# Patient Record
Sex: FEMALE | Race: WHITE | Employment: OTHER | URBAN - METROPOLITAN AREA
[De-identification: names, ages, dates, MRNs, and addresses within clinical notes are randomized per-mention and may not be internally consistent; named-entity substitution may affect disease eponyms.]

---

## 2019-04-23 ENCOUNTER — HOSPITAL ENCOUNTER (OUTPATIENT)
Dept: GENERAL RADIOLOGY | Age: 69
Discharge: HOME | End: 2019-04-25
Payer: MEDICARE

## 2019-04-23 ENCOUNTER — HOSPITAL ENCOUNTER (OUTPATIENT)
Age: 69
Discharge: HOME | End: 2019-04-25
Payer: MEDICARE

## 2019-04-23 DIAGNOSIS — M25.561 RIGHT KNEE PAIN, UNSPECIFIED CHRONICITY: ICD-10-CM

## 2019-04-23 PROCEDURE — 73562 X-RAY EXAM OF KNEE 3: CPT

## 2019-07-15 ENCOUNTER — HOSPITAL ENCOUNTER (OUTPATIENT)
Age: 69
Discharge: HOME | End: 2019-07-17
Payer: MEDICARE

## 2019-07-15 ENCOUNTER — HOSPITAL ENCOUNTER (OUTPATIENT)
Dept: GENERAL RADIOLOGY | Age: 69
Discharge: HOME | End: 2019-07-17
Payer: MEDICARE

## 2019-07-15 DIAGNOSIS — M25.512 LEFT SHOULDER PAIN, UNSPECIFIED CHRONICITY: ICD-10-CM

## 2019-07-15 DIAGNOSIS — M54.2 CERVICALGIA: ICD-10-CM

## 2019-07-15 PROCEDURE — 73030 X-RAY EXAM OF SHOULDER: CPT

## 2019-07-15 PROCEDURE — 72050 X-RAY EXAM NECK SPINE 4/5VWS: CPT

## 2022-12-01 SDOH — ECONOMIC STABILITY: FOOD INSECURITY: ADDITIONAL INFORMATION: NO

## 2022-12-07 ENCOUNTER — HOSPITAL ENCOUNTER (OUTPATIENT)
Dept: DIABETES SERVICES | Age: 72
Setting detail: THERAPIES SERIES
Discharge: HOME | End: 2022-12-07
Payer: MEDICARE

## 2022-12-07 PROCEDURE — G0109 DIAB MANAGE TRN IND/GROUP: HCPCS

## 2022-12-07 ASSESSMENT — PROBLEM AREAS IN DIABETES QUESTIONNAIRE (PAID)
FEELING DEPRESSED WHEN YOU THINK ABOUT LIVING WITH DIABETES: 2
PAID-5 TOTAL SCORE: 9
COPING WITH COMPLICATIONS OF DIABETES: 2
FEELING SCARED WHEN YOU THINK ABOUT LIVING WITH DIABETES: 2
WORRYING ABOUT THE FUTURE AND THE POSSIBILITY OF SERIOUS COMPLICATIONS: 2
FEELING THAT DIABETES IS TAKING UP TOO MUCH OF YOUR MENTAL AND PHYSICAL ENERGY EVERY DAY: 1

## 2022-12-07 NOTE — PROGRESS NOTES
Diabetes Self-Management Education Record    Participant Name: Ruslan Carolina  Referring Provider: Christina Guevara DO  Assessment/Evaluation Ratings:  1=Needs Instruction   4=Demonstrates Understanding/Competency  2=Needs Review   NC=Not Covered    3=Comprehends Key Points  N/A=Not Applicable  Topics/Learning Objectives Pre-session Assess Date:  Instructor initials/date  12/7/22 PC Instr. Date  12/7/22 PC  Instructor initials/date Follow-up Post- session Eval Comments   Diabetes disease process & Treatment process:   -Define type of diabetes in simple terms.  - Describe the ABCs of  diabetes management  -Identify own type of diabetes  -Identify lifestyle changes/treatment options  -other:  1 [x] All     []  []  []  []  []  3 12/7/22 PC  New onset Type 2 DM  Family hx     Developing strategies for Healthy coping/psychosocial issues:    -Describe feelings about living with diabetes  -Identify coping strategies and sources of stress  -Identify support needed & support network available  -Complete PAID-5 Diabetes questionnaire 1  All   [x]  []  []  []    []  3 Date:12/7/22 PC  PAID-5 Score:9  Confidence Score: [address] completed a Diabetes Self- Management Education Assessment on 12/7/22. Part of our assessment is having the patient complete the PAID (Problem Areas in Diabetes Scale)-5 survey. This tool  measures diabetes-related emotional distress a patient may be feeling. Ruslan Carolina scored  9   A total score of >8 indicates possible diabetes related emotional distress, which warrants further assessment and a referral to mental health professional for psychological support and treatment. Behavioral health information given.   PC       Prevention, detection & treatment of Chronic complications:    -Identify the prevention, detection and treatment for complications including immunizations, preventive eye, foot, dental and renal exams as indicated per the participant's duration of diabetes and health status.  -Define the natural course of diabetes and the relationship of blood glucose levels to long term complications of diabetes. 1 [x] All     []            []  3 12/7/22   Hypertension  Cataracts-severe   Prevention, detection & treatment of acute complications:    -State the causes,signs & symptoms of hyper & hypoglycemia, and prevention & treatment strategies.   -Describe sick day guidelines  DKA /indications for ketone testing &  when to call physician  1 [x] All     []      []    3 12/7/22   Hyperglycemia    A1C 12%             -Identify severe weather/situation crisis  & diabetes supplies management 1 []      Using medications safely:   -State effects of diabetes medicines on blood glucose levels;  -List diabetes medication taken, action & side effects 1 [x] All     []  []  3 12/7/22  Metformin at home   Insulin/Injectables/glucagon  -Name appropriate injection sites; proper storage; supplies needed;  NA   []       Demonstrate proper technique  []      Monitoring blood glucose, interpreting and using results:   -Identify the purpose of testing   -Identify recommended & personal blood glucose targets & HgbA1C target levels  -State the Importance of logging blood glucose levels for pattern recognition;   -State benefits of reading/using pt generated health data  -Verbalize safe lancet disposal 1 [x] All     []  []    []  []  []  3  12/7/22   Has meter and testing ac breakfast daily. Asked to log results and take to Dr visits. Ac range 250-350 mg/dl   -Demonstrate proper testing technique  []      Incorporating physical activity into lifestyle:   -State effect of exercise on blood glucose levels;   -State benefits of regular exercise;   -Define safety considerations/food choices if needed.  -Describe contraindications/maintenance of activity.  1 [x] All     []  []    []  []  3 12/7/22   No routine exercise at this time   Incorporating nutritional management into lifestyle:   -Describe effect of type, amount & timing of food on blood glucose  -Describe methods for preparing and planning   healthy meals  -Correctly read food labels for nutritional values  -Name 3 foods high in Carbohydrate  -Plan a sample 4 carbohydrate-controlled meal using Diabetes Plate Method  -Verbalized ability to measure and count carbohydrate gram servings using food labels and carbohydrate food list.    -Plan a carbohydrate-controlled meal based on individual needs/preferences from a Registered Dietitian.   1 [] All       []    []    []  []      []        []                      Developing strategies for problem solving to promote health/change behavior. -Identify 7 self-care behaviors; Personal health risk factors; Benefits, challenges & strategies for behavioral change and set an individualized goal selection. 1       []      []Nutrition  []Monitoring  []Exercise  []Medication  []Other     Identified Barriers to learning/adherence to self management plan:    None  []  other    Instruction Method:  Lecture/Discussion, Power Point Presentation , Handouts, and Return demonstration     Supporting Education Materials/Equipment Provided: Self-management manual and Nutritional Packet   []Maltese materials       [] services     []Other:      Encounter Type Date Attended Start Time End Time Comments No Show Dates   Assessment 12/7/22 PC         Session 1 12/7/22 PC 0900 1130  In person    Session 2        1:1 DSMES          In person Follow-up         Gestational Diabetes         DSMES #3        Meter Instrx        Insulin Instrx           Additional Comments: [] Pt seen individually due to Covid-19 Safety precautions and no group session available.         Date:   Follow-up goal attainment based on patients initial DSMES goal    Dr Notified by [] EMR []Fax        []Post class Hgb A1C  []Medication compliance   []Plate method/meal plan compliance   []Able to state the number of Carbohydrate servings eaten at B,L,D   []Testing blood glucose as prescribed by PCP   []Exercise Routine   []Other:   []Other:     []Patient lost to follow-up  Dr Notified by []EMR []Fax

## 2022-12-08 ENCOUNTER — HOSPITAL ENCOUNTER (OUTPATIENT)
Dept: DIABETES SERVICES | Age: 72
Setting detail: THERAPIES SERIES
Discharge: HOME | End: 2022-12-08
Payer: MEDICARE

## 2022-12-08 PROCEDURE — G0109 DIAB MANAGE TRN IND/GROUP: HCPCS

## 2022-12-08 NOTE — PROGRESS NOTES
Diabetes Self-Management Education Record    Participant Name: Norris Esqueda  Referring Provider: Haley Harley,   Assessment/Evaluation Ratings:  1=Needs Instruction   4=Demonstrates Understanding/Competency  2=Needs Review   NC=Not Covered    3=Comprehends Key Points  N/A=Not Applicable  Topics/Learning Objectives Pre-session Assess Date:  Instructor initials/date  12/7/22 PC Instr. Date  12/7/22 PC  Instructor initials/date Follow-up Post- session Eval Comments   Diabetes disease process & Treatment process:   -Define type of diabetes in simple terms.  - Describe the ABCs of  diabetes management  -Identify own type of diabetes  -Identify lifestyle changes/treatment options  -other:  1 [x] All     []  []  []  []  []  3 12/7/22 PC  New onset Type 2 DM  Family hx     Developing strategies for Healthy coping/psychosocial issues:    -Describe feelings about living with diabetes  -Identify coping strategies and sources of stress  -Identify support needed & support network available  -Complete PAID-5 Diabetes questionnaire 1  All   [x]  []  []  []    []  3 Date:12/7/22   PAID-5 Score:9  Confidence Score: [address] completed a Diabetes Self- Management Education Assessment on 12/7/22. Part of our assessment is having the patient complete the PAID (Problem Areas in Diabetes Scale)-5 survey. This tool  measures diabetes-related emotional distress a patient may be feeling. Norris Esqueda scored  9   A total score of >8 indicates possible diabetes related emotional distress, which warrants further assessment and a referral to mental health professional for psychological support and treatment. Behavioral health information given.   PC       Prevention, detection & treatment of Chronic complications:    -Identify the prevention, detection and treatment for complications including immunizations, preventive eye, foot, dental and renal exams as indicated per the participant's duration of diabetes and health status.  -Define the natural course of diabetes and the relationship of blood glucose levels to long term complications of diabetes. 1 [x] All     []            []  3 12/7/22   Hypertension  Cataracts-severe   Prevention, detection & treatment of acute complications:    -State the causes,signs & symptoms of hyper & hypoglycemia, and prevention & treatment strategies.   -Describe sick day guidelines  DKA /indications for ketone testing &  when to call physician  1 [x] All     []      []    3 12/7/22   Hyperglycemia    A1C 12%             -Identify severe weather/situation crisis  & diabetes supplies management 1 []      Using medications safely:   -State effects of diabetes medicines on blood glucose levels;  -List diabetes medication taken, action & side effects 1 [x] All     []  []  3 12/7/22  Metformin at home   Insulin/Injectables/glucagon  -Name appropriate injection sites; proper storage; supplies needed;  NA   []       Demonstrate proper technique  []      Monitoring blood glucose, interpreting and using results:   -Identify the purpose of testing   -Identify recommended & personal blood glucose targets & HgbA1C target levels  -State the Importance of logging blood glucose levels for pattern recognition;   -State benefits of reading/using pt generated health data  -Verbalize safe lancet disposal 1 [x] All     []  []    []  []  []  3  12/7/22   Has meter and testing ac breakfast daily. Asked to log results and take to Dr visits. Ac range 250-350 mg/dl   -Demonstrate proper testing technique  []      Incorporating physical activity into lifestyle:   -State effect of exercise on blood glucose levels;   -State benefits of regular exercise;   -Define safety considerations/food choices if needed.  -Describe contraindications/maintenance of activity.  1 [x] All     []  []    []  []  3 12/7/22   No routine exercise at this time   Incorporating nutritional management into lifestyle:   -Describe effect of type, amount & timing of food on blood glucose  -Describe methods for preparing and planning   healthy meals  -Correctly read food labels for nutritional values  -Name 3 foods high in Carbohydrate  -Plan a sample 4 carbohydrate-controlled meal using Diabetes Plate Method  -Verbalized ability to measure and count carbohydrate gram servings using food labels and carbohydrate food list.    -Plan a carbohydrate-controlled meal based on individual needs/preferences from a Registered Dietitian.   1 [] All       [x]    [x]    [x]  [x]      [x]        []  3 12/8/22 SE                   Developing strategies for problem solving to promote health/change behavior. -Identify 7 self-care behaviors; Personal health risk factors; Benefits, challenges & strategies for behavioral change and set an individualized goal selection. 1       [x]  3  12/8/22 SE  I will increase my activity at least 2 days a week for 5 min each day  []Nutrition  []Monitoring  [x]Exercise  []Medication  []Other     Identified Barriers to learning/adherence to self management plan:    None  []  other    Instruction Method:  Lecture/Discussion, Power Point Presentation , Handouts, and Return demonstration     Supporting Education Materials/Equipment Provided: Self-management manual and Nutritional Packet   []Croatian materials       [] services     []Other:      Encounter Type Date Attended Start Time End Time Comments No Show Dates   Assessment 12/7/22 PC         Session 1 12/7/22 PC 0900 1130  In person    Session 2 12/8/22 SE 0900 1130 In person    1:1 DSMES          In person Follow-up         Gestational Diabetes         DSMES #3        Meter Instrx        Insulin Instrx           Additional Comments: [] Pt seen individually due to Covid-19 Safety precautions and no group session available. Brought a large apple on Day 1 :-) Having difficulty seeing. Cataract surgery scheduled next month.       Date:   Follow-up goal attainment based on patients initial DSMES goal    Dr Notified by [] EMR []Fax        []Post class Hgb A1C  []Medication compliance   []Plate method/meal plan compliance   []Able to state the number of Carbohydrate servings eaten at B,L,D   []Testing blood glucose as prescribed by PCP   []Exercise Routine   []Other:   []Other:     []Patient lost to follow-up  Dr Notified by []EMR []Fax

## 2022-12-08 NOTE — LETTER
[facility])- Diabetes Education Department Diabetes Education Letter    2022       Re:     Wilfred Christie         :  1950  Dear Dr. Goyo Puri: Thank you for referring your patient, Wilfred Christie, to [facility]) diabetes education. Wilfred Christie has completed their personalized comprehensive education plan. The education plan included the following topics:          Diabetes disease process & treatment   Healthy Eating  Being Active  Taking Medication  Monitoring Glucose  Acute and Chronic Complications  Lifestyle and Healthy coping  Diabetes Distress and Support       In addition, the PAID -5 (Problem Areas in Diabetes) Survey was completed. The results From 2022 were 9  A total score of 8 or greater indicates possible diabetes related emotional distress which warrants further assessment.  [] [address]  and [facility] information provided. Wilfred Christie selected behavioral goal is:    []Healthy Eating  []Monitoring  []Problem Solving  [x]Being Active  []Taking Medication  []Other: We will send a follow-up letter in 3 months to notify you of their progress. Thank you for referring this patient to our program.  Please do not hesitate to call if you have any questions at [phone] [facility] or [facility]) or [phone] ([address] [place] [address]).         Sincerely,    [facility] Diabetes Education Department  American Diabetes Association Recognized DSMES Program rev. 10/19/22

## 2022-12-19 ENCOUNTER — HOSPITAL ENCOUNTER (OUTPATIENT)
Dept: DIABETES SERVICES | Age: 72
Setting detail: THERAPIES SERIES
Discharge: HOME | End: 2022-12-19
Payer: MEDICARE

## 2022-12-19 PROCEDURE — G0108 DIAB MANAGE TRN  PER INDIV: HCPCS

## 2022-12-19 NOTE — PROGRESS NOTES
Diabetes Self-Management Education Record    Participant Name: Jl Linder  Referring Provider: Iman Denson DO  Assessment/Evaluation Ratings:  1=Needs Instruction   4=Demonstrates Understanding/Competency  2=Needs Review   NC=Not Covered    3=Comprehends Key Points  N/A=Not Applicable  Topics/Learning Objectives Pre-session Assess Date:  Instructor initials/date  12/7/22 PC Instr. Date  12/7/22 PC  Instructor initials/date Follow-up Post- session Eval Comments   Diabetes disease process & Treatment process:   -Define type of diabetes in simple terms.  - Describe the ABCs of  diabetes management  -Identify own type of diabetes  -Identify lifestyle changes/treatment options  -other:  1 [x] All     []  []  []  []  []  3 12/7/22 PC  New onset Type 2 DM  Family hx     Developing strategies for Healthy coping/psychosocial issues:    -Describe feelings about living with diabetes  -Identify coping strategies and sources of stress  -Identify support needed & support network available  -Complete PAID-5 Diabetes questionnaire 1  All   [x]  []  []  []    []  3 Date:12/7/22 PC  PAID-5 Score:9  Confidence Score: [address] completed a Diabetes Self- Management Education Assessment on 12/7/22. Part of our assessment is having the patient complete the PAID (Problem Areas in Diabetes Scale)-5 survey. This tool  measures diabetes-related emotional distress a patient may be feeling. Jl Linder scored  9   A total score of >8 indicates possible diabetes related emotional distress, which warrants further assessment and a referral to mental health professional for psychological support and treatment. Behavioral health information given.   PC       Prevention, detection & treatment of Chronic complications:    -Identify the prevention, detection and treatment for complications including immunizations, preventive eye, foot, dental and renal exams as indicated per the participant's duration of diabetes and health status.  -Define the natural course of diabetes and the relationship of blood glucose levels to long term complications of diabetes. 1 [x] All     []            []  3 12/7/22   Hypertension  Cataracts-severe   Prevention, detection & treatment of acute complications:    -State the causes,signs & symptoms of hyper & hypoglycemia, and prevention & treatment strategies.   -Describe sick day guidelines  DKA /indications for ketone testing &  when to call physician  1 [x] All     []      []    3 12/7/22   Hyperglycemia    A1C 12%             -Identify severe weather/situation crisis  & diabetes supplies management 1 []      Using medications safely:   -State effects of diabetes medicines on blood glucose levels;  -List diabetes medication taken, action & side effects 1 [x] All     []  []  3 12/7/22  Metformin at home   Insulin/Injectables/glucagon  -Name appropriate injection sites; proper storage; supplies needed;  NA   []       Demonstrate proper technique  []      Monitoring blood glucose, interpreting and using results:   -Identify the purpose of testing   -Identify recommended & personal blood glucose targets & HgbA1C target levels  -State the Importance of logging blood glucose levels for pattern recognition;   -State benefits of reading/using pt generated health data  -Verbalize safe lancet disposal 1 [x] All     []  []    []  []  []  3  12/7/22   Has meter and testing ac breakfast daily. Asked to log results and take to Dr visits. Ac range 250-350 mg/dl    12/19/22 SE  Recommend checking BS 2-4 times a day. Will talk to dr. Barry Bryantonstrate proper testing technique  []      Incorporating physical activity into lifestyle:   -State effect of exercise on blood glucose levels;   -State benefits of regular exercise;   -Define safety considerations/food choices if needed.  -Describe contraindications/maintenance of activity.  1 [x] All     []  []    []  []  3 12/7/22   No routine exercise at this time  12/19/22 SE  Encouraged activity to ability after each meal.    Incorporating nutritional management into lifestyle:   -Describe effect of type, amount & timing of food on blood glucose  -Describe methods for preparing and planning   healthy meals  -Correctly read food labels for nutritional values  -Name 3 foods high in Carbohydrate  -Plan a sample 4 carbohydrate-controlled meal using Diabetes Plate Method  -Verbalized ability to measure and count carbohydrate gram servings using food labels and carbohydrate food list.    -Plan a carbohydrate-controlled meal based on individual needs/preferences from a Registered Dietitian.   1 [] All       [x]    [x]    [x]  [x]      [x]        [x]  3 12/8/22 SE  Reviewed nutritional recommendations for salt, added sugar, fats, and fiber. Reviewed plate method and portion control. Reviewed which foods contain carbohydrates, how carbs impact blood glucose, how to count carbs, and how to spread carbs evenly throughout the day. Label reading reviewed. Patient very attentive to education and demonstrated understanding. Asked questions and took notes. 12/19/22 SE  Pt instructed on  Calories, 3 meals  3 Snack(4-1-4-1-4-1. Pt was able to plan meal using food models and serving size sheet. Provided patient with meal and snack options that fit her meal plan to choose from for better compliance. Developing strategies for problem solving to promote health/change behavior. -Identify 7 self-care behaviors; Personal health risk factors; Benefits, challenges & strategies for behavioral change and set an individualized goal selection.  1       [x]  3  12/8/22 SE  I will increase my activity at least 2 days a week for 5 min each day  []Nutrition  []Monitoring  [x]Exercise  []Medication  []Other     Identified Barriers to learning/adherence to self management plan:    None  []  other    Instruction Method:  Lecture/Discussion, Power [employer] , Handouts, and Return demonstration     Supporting Education Materials/Equipment Provided: Self-management manual and Nutritional Packet   []Australian materials       [] services     []Other:      Encounter Type Date Attended Start Time End Time Comments No Show Dates   Assessment 12/7/22 PC         Session 1 12/7/22 PC 0900 1130  In person    Session 2  Session 3 12/8/22 SE  12/19/22 SE 0900  [id number]  [id number] In person  In person    1:1 DSMES          In person Follow-up         Gestational Diabetes         DSMES #3        Meter Instrx        Insulin Instrx           Additional Comments: [] Pt seen individually due to Covid-19 Safety precautions and no group session available. Brought a large apple on Day 1 :-) Having difficulty seeing. Cataract surgery scheduled next month.       Date:   Follow-up goal attainment based on patients initial DSMES goal    Dr Notified by [] EMR []Fax        []Post class Hgb A1C  []Medication compliance   []Plate method/meal plan compliance   []Able to state the number of Carbohydrate servings eaten at B,L,D   []Testing blood glucose as prescribed by PCP   []Exercise Routine   []Other:   []Other:     []Patient lost to follow-up  Dr Notified by []EMR []Fax

## 2023-02-21 PROBLEM — M20.5X2 ACQUIRED CLAW TOE OF LEFT FOOT: Status: ACTIVE | Noted: 2023-02-21

## 2023-02-21 PROBLEM — R10.9 ABDOMINAL PAIN: Status: RESOLVED | Noted: 2023-02-21 | Resolved: 2023-02-21

## 2023-02-21 PROBLEM — R10.9 ABDOMINAL PAIN: Status: ACTIVE | Noted: 2023-02-21

## 2023-02-21 PROBLEM — R93.5 ABNORMAL CT OF THE ABDOMEN: Status: ACTIVE | Noted: 2023-02-21

## 2023-02-21 PROBLEM — M20.5X2 ACQUIRED CLAW TOE OF LEFT FOOT: Status: RESOLVED | Noted: 2023-02-21 | Resolved: 2023-02-21

## 2023-02-21 PROBLEM — R93.5 ABNORMAL CT OF THE ABDOMEN: Status: RESOLVED | Noted: 2023-02-21 | Resolved: 2023-02-21

## 2023-02-21 PROBLEM — R93.89 ABNORMAL X-RAY: Status: RESOLVED | Noted: 2023-02-21 | Resolved: 2023-02-21

## 2023-02-21 PROBLEM — R93.89 ABNORMAL X-RAY: Status: ACTIVE | Noted: 2023-02-21

## 2023-02-21 RX ORDER — DESVENLAFAXINE 100 MG/1
100 TABLET, EXTENDED RELEASE ORAL DAILY
COMMUNITY
End: 2023-06-07 | Stop reason: SDUPTHER

## 2023-02-21 RX ORDER — MEMANTINE HYDROCHLORIDE 10 MG/1
10 TABLET ORAL DAILY
COMMUNITY
End: 2023-06-07 | Stop reason: SDUPTHER

## 2023-02-21 RX ORDER — DONEPEZIL HYDROCHLORIDE 10 MG/1
10 TABLET, FILM COATED ORAL DAILY
COMMUNITY
End: 2023-06-07 | Stop reason: SDUPTHER

## 2023-06-07 ENCOUNTER — OFFICE VISIT (OUTPATIENT)
Dept: PRIMARY CARE | Facility: CLINIC | Age: 73
End: 2023-06-07
Payer: MEDICARE

## 2023-06-07 VITALS
OXYGEN SATURATION: 75 % | TEMPERATURE: 98.3 F | SYSTOLIC BLOOD PRESSURE: 148 MMHG | WEIGHT: 201 LBS | BODY MASS INDEX: 37.98 KG/M2 | DIASTOLIC BLOOD PRESSURE: 87 MMHG | HEART RATE: 60 BPM

## 2023-06-07 DIAGNOSIS — G47.33 OBSTRUCTIVE SLEEP APNEA SYNDROME: ICD-10-CM

## 2023-06-07 DIAGNOSIS — E55.9 MILD VITAMIN D DEFICIENCY: ICD-10-CM

## 2023-06-07 DIAGNOSIS — F32.5 MAJOR DEPRESSIVE DISORDER WITH SINGLE EPISODE, IN FULL REMISSION (CMS-HCC): Primary | ICD-10-CM

## 2023-06-07 DIAGNOSIS — F02.80 EARLY ONSET ALZHEIMER'S DEMENTIA WITHOUT BEHAVIORAL DISTURBANCE (MULTI): ICD-10-CM

## 2023-06-07 DIAGNOSIS — F32.1 CURRENT MODERATE EPISODE OF MAJOR DEPRESSIVE DISORDER WITHOUT PRIOR EPISODE (MULTI): ICD-10-CM

## 2023-06-07 DIAGNOSIS — F03.B3 MODERATE DEMENTIA WITH MOOD DISTURBANCE, UNSPECIFIED DEMENTIA TYPE (MULTI): ICD-10-CM

## 2023-06-07 DIAGNOSIS — E66.01 OBESITY, MORBID (MULTI): ICD-10-CM

## 2023-06-07 DIAGNOSIS — G30.0 EARLY ONSET ALZHEIMER'S DEMENTIA WITHOUT BEHAVIORAL DISTURBANCE (MULTI): ICD-10-CM

## 2023-06-07 PROBLEM — N20.0 RENAL CALCULUS, LEFT: Status: ACTIVE | Noted: 2023-06-07

## 2023-06-07 PROBLEM — N20.1 LEFT URETERAL CALCULUS: Status: ACTIVE | Noted: 2023-06-07

## 2023-06-07 PROBLEM — M48.061 LUMBAR CANAL STENOSIS: Status: ACTIVE | Noted: 2023-06-07

## 2023-06-07 PROBLEM — H93.12 TINNITUS OF LEFT EAR: Status: ACTIVE | Noted: 2023-06-07

## 2023-06-07 PROBLEM — I10 HYPERTENSION: Status: ACTIVE | Noted: 2023-06-07

## 2023-06-07 PROBLEM — N20.0 CALCIUM KIDNEY STONE: Status: ACTIVE | Noted: 2023-06-07

## 2023-06-07 PROBLEM — R32 URINARY INCONTINENCE: Status: ACTIVE | Noted: 2023-06-07

## 2023-06-07 PROBLEM — R53.83 FATIGUE: Status: ACTIVE | Noted: 2023-06-07

## 2023-06-07 PROBLEM — M17.10 ARTHRITIS OF KNEE: Status: ACTIVE | Noted: 2023-06-07

## 2023-06-07 PROBLEM — R59.1 LYMPHADENOPATHY: Status: ACTIVE | Noted: 2023-06-07

## 2023-06-07 PROBLEM — R11.2 NAUSEA AND VOMITING: Status: ACTIVE | Noted: 2023-06-07

## 2023-06-07 PROBLEM — G31.84 MILD COGNITIVE IMPAIRMENT WITH MEMORY LOSS: Status: ACTIVE | Noted: 2023-06-07

## 2023-06-07 PROBLEM — F41.9 ANXIETY: Status: ACTIVE | Noted: 2023-06-07

## 2023-06-07 PROBLEM — M25.552 LEFT HIP PAIN: Status: ACTIVE | Noted: 2023-06-07

## 2023-06-07 PROBLEM — R93.89 ABNORMAL X-RAY: Status: ACTIVE | Noted: 2023-06-07

## 2023-06-07 PROBLEM — D72.819 LEUKOPENIA: Status: ACTIVE | Noted: 2023-06-07

## 2023-06-07 PROBLEM — R39.9 UTI SYMPTOMS: Status: ACTIVE | Noted: 2023-06-07

## 2023-06-07 PROBLEM — R40.0 DAYTIME SOMNOLENCE: Status: ACTIVE | Noted: 2023-06-07

## 2023-06-07 PROBLEM — M21.612 HALLUX VALGUS WITH BUNIONS OF LEFT FOOT: Status: ACTIVE | Noted: 2023-06-07

## 2023-06-07 PROBLEM — M20.12 HALLUX VALGUS WITH BUNIONS OF LEFT FOOT: Status: ACTIVE | Noted: 2023-06-07

## 2023-06-07 PROBLEM — J32.9 SINUSITIS: Status: ACTIVE | Noted: 2023-06-07

## 2023-06-07 PROBLEM — M20.5X2 ACQUIRED CLAW TOE OF LEFT FOOT: Status: ACTIVE | Noted: 2023-06-07

## 2023-06-07 PROBLEM — R93.5 ABNORMAL CT OF THE ABDOMEN: Status: ACTIVE | Noted: 2023-06-07

## 2023-06-07 PROBLEM — R10.32 LEFT LOWER QUADRANT ABDOMINAL PAIN OF UNKNOWN ETIOLOGY: Status: ACTIVE | Noted: 2023-06-07

## 2023-06-07 PROBLEM — K52.9 CHRONIC DIARRHEA: Status: ACTIVE | Noted: 2023-06-07

## 2023-06-07 PROBLEM — M21.622 TAILOR'S BUNION OF LEFT FOOT: Status: ACTIVE | Noted: 2023-06-07

## 2023-06-07 PROBLEM — F32.9 DEPRESSION, MAJOR: Status: ACTIVE | Noted: 2023-06-07

## 2023-06-07 PROBLEM — K65.4 SCLEROSING MESENTERITIS (MULTI): Status: ACTIVE | Noted: 2023-06-07

## 2023-06-07 PROBLEM — M54.50 LOW BACK PAIN: Status: ACTIVE | Noted: 2023-06-07

## 2023-06-07 PROBLEM — N63.10 LUMP OF RIGHT BREAST: Status: ACTIVE | Noted: 2023-06-07

## 2023-06-07 PROBLEM — R41.3 MEMORY LOSS OR IMPAIRMENT: Status: ACTIVE | Noted: 2023-06-07

## 2023-06-07 PROBLEM — S20.219A RIB CONTUSION: Status: ACTIVE | Noted: 2023-06-07

## 2023-06-07 PROBLEM — R10.9 ABDOMINAL PAIN: Status: ACTIVE | Noted: 2023-06-07

## 2023-06-07 PROCEDURE — 3077F SYST BP >= 140 MM HG: CPT | Performed by: FAMILY MEDICINE

## 2023-06-07 PROCEDURE — 3079F DIAST BP 80-89 MM HG: CPT | Performed by: FAMILY MEDICINE

## 2023-06-07 PROCEDURE — 1159F MED LIST DOCD IN RCRD: CPT | Performed by: FAMILY MEDICINE

## 2023-06-07 PROCEDURE — 99215 OFFICE O/P EST HI 40 MIN: CPT | Performed by: FAMILY MEDICINE

## 2023-06-07 RX ORDER — OXYCODONE HYDROCHLORIDE 5 MG/1
5 TABLET ORAL EVERY 6 HOURS
COMMUNITY
Start: 2022-10-24 | End: 2023-06-07 | Stop reason: ALTCHOICE

## 2023-06-07 RX ORDER — DESVENLAFAXINE 100 MG/1
100 TABLET, EXTENDED RELEASE ORAL DAILY
Qty: 90 TABLET | Refills: 1 | Status: SHIPPED | OUTPATIENT
Start: 2023-06-07 | End: 2023-08-23 | Stop reason: SDUPTHER

## 2023-06-07 RX ORDER — DESVENLAFAXINE SUCCINATE 50 MG/1
50 TABLET, EXTENDED RELEASE ORAL DAILY
Qty: 7 TABLET | Refills: 0 | Status: SHIPPED | OUTPATIENT
Start: 2023-06-07 | End: 2023-08-23 | Stop reason: WASHOUT

## 2023-06-07 RX ORDER — SODIUM PICOSULFATE, MAGNESIUM OXIDE, AND ANHYDROUS CITRIC ACID 10; 3.5; 12 MG/160ML; G/160ML; G/160ML
LIQUID ORAL
COMMUNITY
Start: 2022-12-30 | End: 2023-06-07 | Stop reason: ALTCHOICE

## 2023-06-07 RX ORDER — VITS A,C,E/LUTEIN/MINERALS 300MCG-200
1 TABLET ORAL DAILY
COMMUNITY

## 2023-06-07 RX ORDER — ACETAMINOPHEN 500 MG
TABLET ORAL
COMMUNITY

## 2023-06-07 RX ORDER — ENOXAPARIN SODIUM 100 MG/ML
INJECTION SUBCUTANEOUS
COMMUNITY
Start: 2022-06-08 | End: 2023-06-07 | Stop reason: ALTCHOICE

## 2023-06-07 RX ORDER — OXYCODONE AND ACETAMINOPHEN 5; 325 MG/1; MG/1
TABLET ORAL
COMMUNITY
Start: 2022-06-22 | End: 2023-06-07 | Stop reason: ALTCHOICE

## 2023-06-07 RX ORDER — RAMIPRIL 10 MG/1
10 CAPSULE ORAL
COMMUNITY
End: 2023-08-23 | Stop reason: ALTCHOICE

## 2023-06-07 RX ORDER — ELECTROLYTES/DEXTROSE
SOLUTION, ORAL ORAL
COMMUNITY
End: 2023-11-17 | Stop reason: WASHOUT

## 2023-06-07 RX ORDER — MEMANTINE HYDROCHLORIDE 10 MG/1
10 TABLET ORAL 2 TIMES DAILY
Qty: 180 TABLET | Refills: 0 | Status: SHIPPED | OUTPATIENT
Start: 2023-06-07 | End: 2023-08-23 | Stop reason: SDUPTHER

## 2023-06-07 RX ORDER — DONEPEZIL HYDROCHLORIDE 10 MG/1
10 TABLET, FILM COATED ORAL NIGHTLY
Qty: 90 TABLET | Refills: 0 | Status: SHIPPED | OUTPATIENT
Start: 2023-06-07 | End: 2023-08-23 | Stop reason: SDUPTHER

## 2023-06-07 RX ORDER — ASCORBIC ACID 500 MG
1 TABLET ORAL DAILY
COMMUNITY
End: 2023-11-17 | Stop reason: WASHOUT

## 2023-06-07 NOTE — PROGRESS NOTES
Subjective   Patient ID: Tabby Morrell is a 72 y.o. female who presents for No chief complaint on file..    HPI pt is here with Мария Henning who is helping her since she is having difficulty, she has finished her pristiq and has been off for about 1 month.  She is tearful off and on and having more difficult time than usual.   She reports memory difficulties. She has been out of medication including desvenlafaxine for at least 1 month. She had many episodes of crying. She feels on edge to any noise. She took this medicaiton for many years and did well on it and will restart    She had an evaluation done for her driving license and it was determined she was unable to drive. She has feelings of loss of independence.     She has an appoinment on 06/07 at the [facility] for evaluation for a new neurologist as hers retired. She will be seen by geriatrics and neuro.  Will refill meds she takes from   Dr Ricks for now and then the new neurologist can change as needed    She recently restarted using her CPAP machine.  She was told this may help her memory problems    She is up to date on vaccinations.     She gets 31286 steps daily and she knows that exercise helps cognitive function. She walks her dog a lot and she has exercise equipment in her house for strength training and aerobics    She complains of red spots under her breast. She has some small hemangiomas    BP elevated some today, she has bp cuff at home she thinks and if not will get one, will monitor and call in her bp's and see if needs meds    Review of Systems    Objective   /87   Pulse 60   Temp 36.8 °C (98.3 °F)   Wt 91.2 kg (201 lb)   SpO2 (!) 75%   BMI 37.98 kg/m²     Physical Exam  Constitutional:       Appearance: Normal appearance.   Cardiovascular:      Rate and Rhythm: Normal rate and regular rhythm.      Pulses: Normal pulses.      Heart sounds: Normal heart sounds.   Pulmonary:      Effort: Pulmonary effort is normal.       Breath sounds: Normal breath sounds.   Musculoskeletal:         General: Normal range of motion.      Cervical back: Normal range of motion and neck supple.   Skin:     General: Skin is warm and dry.   Neurological:      Mental Status: She is alert and oriented to person, place, and time.   Psychiatric:         Thought Content: Thought content normal.         Judgment: Judgment normal.      Comments: Pt often tearful during her appt.         Assessment/Plan     Depression  Start on Desvenlafaxine 50 mg for one week, then increase to 100 mg daily. Prescription sent to pharmacy    Moderate Dementia with Mood Disturbance  Continue on Donepezil 10 mg and memantine 10 mg daily. Prescription sent to pharmacy. No changes made, refilled as she was taking w neurologist prior to correction.  She will get new neurologist and will reevaluate    Hemangiomas under breast.   Educated on hemangiomas.     Hypertension  Advised to get an electronic blood pressure cuff that fits the upper arm.   Advised to monitor pressure regularly, will bring in readings and cuff next visit.  Call if high prior.     Follow up 1 month after evaluation w geriatrics/neuro.      Scribe Attestation  By signing my name below, IMiya Scribe   attest that this documentation has been prepared under the direction and in the presence of Nidhi Severino DO.

## 2023-08-17 NOTE — PROGRESS NOTES
Subjective   Patient ID: Tabby Morrell is a 72 y.o. female who presents for Medicare Annual Wellness Visit Subsequent.    Past Medical, Surgical, and Family History reviewed and updated in chart.     Reviewed all medications by prescribing practitioner or clinical pharmacist (such as prescriptions, OTCs, herbal therapies and supplements) and documented in the medical record.      HPI    Encounter for subsequent AWV: Medicare wellness visit done, patient is in satisfactory living circumstances where the patient's needs are met.  This patient is advised to develop or update their living will and provide us a copy for the chart.        Review of Systems    Patient Care Team:  Nidhi Severino DO as PCP - General  Nidhi Severino DO as PCP - [facility] Attributed Provider     Objective   There were no vitals taken for this visit.    Physical Exam        Assessment/Plan   Problem List Items Addressed This Visit    None

## 2023-08-23 ENCOUNTER — OFFICE VISIT (OUTPATIENT)
Dept: PRIMARY CARE | Facility: CLINIC | Age: 73
End: 2023-08-23
Payer: MEDICARE

## 2023-08-23 VITALS
SYSTOLIC BLOOD PRESSURE: 119 MMHG | WEIGHT: 205 LBS | TEMPERATURE: 98 F | DIASTOLIC BLOOD PRESSURE: 72 MMHG | HEART RATE: 63 BPM | BODY MASS INDEX: 38.73 KG/M2

## 2023-08-23 DIAGNOSIS — M21.962 FOOT DEFORMITY, ACQUIRED, LEFT: ICD-10-CM

## 2023-08-23 DIAGNOSIS — E66.01 OBESITY, MORBID (MULTI): ICD-10-CM

## 2023-08-23 DIAGNOSIS — Z00.00 MEDICARE ANNUAL WELLNESS VISIT, SUBSEQUENT: Primary | ICD-10-CM

## 2023-08-23 DIAGNOSIS — I73.89 OTHER SPECIFIED PERIPHERAL VASCULAR DISEASES (CMS-HCC): ICD-10-CM

## 2023-08-23 DIAGNOSIS — Z78.0 ASYMPTOMATIC MENOPAUSAL STATE: ICD-10-CM

## 2023-08-23 DIAGNOSIS — F03.B3 MODERATE DEMENTIA WITH MOOD DISTURBANCE, UNSPECIFIED DEMENTIA TYPE (MULTI): ICD-10-CM

## 2023-08-23 DIAGNOSIS — F32.5 MAJOR DEPRESSIVE DISORDER WITH SINGLE EPISODE, IN FULL REMISSION (CMS-HCC): ICD-10-CM

## 2023-08-23 DIAGNOSIS — R32 URINARY INCONTINENCE, UNSPECIFIED TYPE: ICD-10-CM

## 2023-08-23 DIAGNOSIS — G30.0 EARLY ONSET ALZHEIMER'S DEMENTIA WITHOUT BEHAVIORAL DISTURBANCE (MULTI): ICD-10-CM

## 2023-08-23 DIAGNOSIS — G47.33 OBSTRUCTIVE SLEEP APNEA SYNDROME: ICD-10-CM

## 2023-08-23 DIAGNOSIS — F02.80 EARLY ONSET ALZHEIMER'S DEMENTIA WITHOUT BEHAVIORAL DISTURBANCE (MULTI): ICD-10-CM

## 2023-08-23 DIAGNOSIS — Z12.31 ENCOUNTER FOR SCREENING MAMMOGRAM FOR BREAST CANCER: ICD-10-CM

## 2023-08-23 PROCEDURE — G0439 PPPS, SUBSEQ VISIT: HCPCS | Performed by: FAMILY MEDICINE

## 2023-08-23 PROCEDURE — 99214 OFFICE O/P EST MOD 30 MIN: CPT | Performed by: FAMILY MEDICINE

## 2023-08-23 PROCEDURE — 1160F RVW MEDS BY RX/DR IN RCRD: CPT | Performed by: FAMILY MEDICINE

## 2023-08-23 PROCEDURE — 3074F SYST BP LT 130 MM HG: CPT | Performed by: FAMILY MEDICINE

## 2023-08-23 PROCEDURE — 1170F FXNL STATUS ASSESSED: CPT | Performed by: FAMILY MEDICINE

## 2023-08-23 PROCEDURE — 1036F TOBACCO NON-USER: CPT | Performed by: FAMILY MEDICINE

## 2023-08-23 PROCEDURE — 1159F MED LIST DOCD IN RCRD: CPT | Performed by: FAMILY MEDICINE

## 2023-08-23 PROCEDURE — 3078F DIAST BP <80 MM HG: CPT | Performed by: FAMILY MEDICINE

## 2023-08-23 RX ORDER — DONEPEZIL HYDROCHLORIDE 10 MG/1
10 TABLET, FILM COATED ORAL NIGHTLY
Qty: 90 TABLET | Refills: 1 | Status: SHIPPED | OUTPATIENT
Start: 2023-08-23 | End: 2024-02-19

## 2023-08-23 RX ORDER — DESVENLAFAXINE 100 MG/1
100 TABLET, EXTENDED RELEASE ORAL DAILY
Qty: 90 TABLET | Refills: 0 | Status: SHIPPED | OUTPATIENT
Start: 2023-08-23 | End: 2024-01-11 | Stop reason: SDUPTHER

## 2023-08-23 RX ORDER — MEMANTINE HYDROCHLORIDE 10 MG/1
10 TABLET ORAL 2 TIMES DAILY
Qty: 180 TABLET | Refills: 1 | Status: SHIPPED | OUTPATIENT
Start: 2023-08-23 | End: 2024-02-19

## 2023-08-23 ASSESSMENT — LIFESTYLE VARIABLES
HOW OFTEN DO YOU HAVE A DRINK CONTAINING ALCOHOL: NEVER
SKIP TO QUESTIONS 9-10: 1
AUDIT-C TOTAL SCORE: 0
HOW OFTEN DO YOU HAVE SIX OR MORE DRINKS ON ONE OCCASION: NEVER
HOW MANY STANDARD DRINKS CONTAINING ALCOHOL DO YOU HAVE ON A TYPICAL DAY: PATIENT DOES NOT DRINK

## 2023-08-23 ASSESSMENT — ENCOUNTER SYMPTOMS
OCCASIONAL FEELINGS OF UNSTEADINESS: 0
LOSS OF SENSATION IN FEET: 0
DEPRESSION: 0

## 2023-08-23 ASSESSMENT — PATIENT HEALTH QUESTIONNAIRE - PHQ9
2. FEELING DOWN, DEPRESSED OR HOPELESS: SEVERAL DAYS
2. FEELING DOWN, DEPRESSED OR HOPELESS: NOT AT ALL
1. LITTLE INTEREST OR PLEASURE IN DOING THINGS: NOT AT ALL
SUM OF ALL RESPONSES TO PHQ9 QUESTIONS 1 AND 2: 0

## 2023-08-23 ASSESSMENT — ACTIVITIES OF DAILY LIVING (ADL)
DRESSING: INDEPENDENT
BATHING: INDEPENDENT
TAKING_MEDICATION: INDEPENDENT
MANAGING_FINANCES: INDEPENDENT
GROCERY_SHOPPING: INDEPENDENT
DOING_HOUSEWORK: INDEPENDENT

## 2023-08-24 NOTE — ASSESSMENT & PLAN NOTE
Pt conintues on medication and seeing neurology regularly, stable at this time and functioning with help of family and friends.  Assessed quarterly

## 2023-10-10 ENCOUNTER — ANCILLARY PROCEDURE (OUTPATIENT)
Dept: RADIOLOGY | Facility: CLINIC | Age: 73
End: 2023-10-10
Payer: MEDICARE

## 2023-10-10 DIAGNOSIS — Z12.31 ENCOUNTER FOR SCREENING MAMMOGRAM FOR MALIGNANT NEOPLASM OF BREAST: ICD-10-CM

## 2023-10-10 DIAGNOSIS — Z78.0 ASYMPTOMATIC MENOPAUSAL STATE: ICD-10-CM

## 2023-10-10 PROCEDURE — 77080 DXA BONE DENSITY AXIAL: CPT

## 2023-10-10 PROCEDURE — 77067 SCR MAMMO BI INCL CAD: CPT | Mod: 50

## 2023-10-10 PROCEDURE — 77063 BREAST TOMOSYNTHESIS BI: CPT | Mod: BILATERAL PROCEDURE

## 2023-10-10 PROCEDURE — 77080 DXA BONE DENSITY AXIAL: CPT | Performed by: RADIOLOGY

## 2023-10-10 PROCEDURE — 77067 SCR MAMMO BI INCL CAD: CPT | Mod: BILATERAL PROCEDURE

## 2023-10-30 ENCOUNTER — TELEPHONE (OUTPATIENT)
Dept: PRIMARY CARE | Facility: CLINIC | Age: 73
End: 2023-10-30
Payer: MEDICARE

## 2023-10-30 NOTE — TELEPHONE ENCOUNTER
Daughter calling , asking for appointment to get clearance to move into senior living, message was given to .

## 2023-11-17 ENCOUNTER — OFFICE VISIT (OUTPATIENT)
Dept: PRIMARY CARE | Facility: CLINIC | Age: 73
End: 2023-11-17
Payer: MEDICARE

## 2023-11-17 VITALS
WEIGHT: 211 LBS | TEMPERATURE: 97.4 F | DIASTOLIC BLOOD PRESSURE: 72 MMHG | SYSTOLIC BLOOD PRESSURE: 134 MMHG | BODY MASS INDEX: 39.84 KG/M2 | HEIGHT: 61 IN | OXYGEN SATURATION: 91 % | HEART RATE: 64 BPM

## 2023-11-17 DIAGNOSIS — M79.671 BILATERAL FOOT PAIN: ICD-10-CM

## 2023-11-17 DIAGNOSIS — M20.12 HALLUX VALGUS WITH BUNIONS OF LEFT FOOT: ICD-10-CM

## 2023-11-17 DIAGNOSIS — R26.89 BALANCE DISORDER: ICD-10-CM

## 2023-11-17 DIAGNOSIS — F33.42 RECURRENT MAJOR DEPRESSIVE DISORDER, IN FULL REMISSION (CMS-HCC): ICD-10-CM

## 2023-11-17 DIAGNOSIS — M17.10 ARTHRITIS OF KNEE: ICD-10-CM

## 2023-11-17 DIAGNOSIS — G62.9 NEUROPATHY: ICD-10-CM

## 2023-11-17 DIAGNOSIS — Z00.00 HEALTHCARE MAINTENANCE: ICD-10-CM

## 2023-11-17 DIAGNOSIS — F02.80 EARLY ONSET ALZHEIMER'S DEMENTIA WITHOUT BEHAVIORAL DISTURBANCE (MULTI): Primary | ICD-10-CM

## 2023-11-17 DIAGNOSIS — M79.672 BILATERAL FOOT PAIN: ICD-10-CM

## 2023-11-17 DIAGNOSIS — M21.612 HALLUX VALGUS WITH BUNIONS OF LEFT FOOT: ICD-10-CM

## 2023-11-17 DIAGNOSIS — G30.0 EARLY ONSET ALZHEIMER'S DEMENTIA WITHOUT BEHAVIORAL DISTURBANCE (MULTI): Primary | ICD-10-CM

## 2023-11-17 DIAGNOSIS — H04.123 DRY EYES: ICD-10-CM

## 2023-11-17 DIAGNOSIS — Z86.19 H/O COLD SORES: ICD-10-CM

## 2023-11-17 DIAGNOSIS — G31.84 MILD COGNITIVE IMPAIRMENT WITH MEMORY LOSS: ICD-10-CM

## 2023-11-17 DIAGNOSIS — F41.9 ANXIETY: ICD-10-CM

## 2023-11-17 DIAGNOSIS — E55.9 MILD VITAMIN D DEFICIENCY: ICD-10-CM

## 2023-11-17 PROBLEM — G30.1 LATE ONSET ALZHEIMER'S DISEASE WITHOUT BEHAVIORAL DISTURBANCE (MULTI): Status: ACTIVE | Noted: 2023-11-17

## 2023-11-17 PROCEDURE — 1036F TOBACCO NON-USER: CPT | Performed by: FAMILY MEDICINE

## 2023-11-17 PROCEDURE — 3075F SYST BP GE 130 - 139MM HG: CPT | Performed by: FAMILY MEDICINE

## 2023-11-17 PROCEDURE — 1126F AMNT PAIN NOTED NONE PRSNT: CPT | Performed by: FAMILY MEDICINE

## 2023-11-17 PROCEDURE — 1160F RVW MEDS BY RX/DR IN RCRD: CPT | Performed by: FAMILY MEDICINE

## 2023-11-17 PROCEDURE — 3078F DIAST BP <80 MM HG: CPT | Performed by: FAMILY MEDICINE

## 2023-11-17 PROCEDURE — 1159F MED LIST DOCD IN RCRD: CPT | Performed by: FAMILY MEDICINE

## 2023-11-17 PROCEDURE — 99215 OFFICE O/P EST HI 40 MIN: CPT | Performed by: FAMILY MEDICINE

## 2023-11-17 RX ORDER — ZINC GLUCONATE 50 MG
500 TABLET ORAL
Qty: 30 CAPSULE | Refills: 0
Start: 2023-11-17

## 2023-11-17 RX ORDER — DICLOFENAC SODIUM 10 MG/G
4 GEL TOPICAL 3 TIMES DAILY PRN
Qty: 100 G | Refills: 0
Start: 2023-11-17

## 2023-11-17 RX ORDER — DIAPER,BRIEF,ADULT, DISPOSABLE
500 EACH MISCELLANEOUS
Qty: 30 TABLET
Start: 2023-11-17

## 2023-11-17 RX ORDER — GUAIFENESIN AND PHENYLEPHRINE HCL 400; 10 MG/1; MG/1
1000 TABLET ORAL
Qty: 30 CAPSULE | Refills: 0
Start: 2023-11-17

## 2023-11-17 NOTE — PROGRESS NOTES
"Subjective   Patient ID: Tabby Morrell is a 73 y.o. female who presents for PAPER WORK.    HPI   The patient presents to the clinic to complete paperwork for senior living. She has past medical history of major depressive disorder, anxiety, and early-onset Alzheimer's disease.    The patient is moving to [facility]. She reports to the clinic as she requires paperwork for her to become resident.  Needs summary of OV stating PCP knows she is changing living situation, med list and supplements and orders for PT/OT and speech therapy for cognition.   The patient requests PT sessions as she has foot pain, mild right knee pain, and balance issues.    The patient also reports that her eye specialist prescribed Oasis Plus eye drops for the patient to use daily, 1 drop each eye bid.    She also reports that she has been using diclofenac gel on her feet and the medication has been working well. She denies any side-effects to the medication.  Needs added to med list so can use in new living situation    The patient continues to take vitamin D3, magnesium, Ocuvite with Luterin, vitamin B complex, and turmeric supplements.    The patient reports that she received the flu vaccine and COVID-19 booster vaccine on 09/17/2023. She also had mammogram and DEXA bone density screenings on 10/10/2023.    She continues with neurology follow up with Dr Guan    Review of Systems    Objective   /72   Pulse 64   Temp 36.3 °C (97.4 °F)   Ht 1.549 m (5' 1\")   Wt 95.7 kg (211 lb)   SpO2 91%   BMI 39.87 kg/m²     Physical Exam    Assessment/Plan   Problem List Items Addressed This Visit             ICD-10-CM    Anxiety F41.9    Arthritis of knee M17.10    Relevant Medications    turmeric root extract 500 mg capsule    diclofenac sodium (Voltaren) 1 % gel gel    Other Relevant Orders    Referral to Physical Therapy    Early onset Alzheimer's dementia without behavioral disturbance (CMS/HCC) - Primary G30.0, F02.80 "    Relevant Orders    Referral to Speech Therapy    Hallux valgus with bunions of left foot M20.12, M21.612    Relevant Orders    Referral to Physical Therapy    Major depression in complete remission (CMS/[facility]) F32.5    Mild cognitive impairment with memory loss G31.84    Relevant Orders    Referral to Speech Therapy    Mild vitamin D deficiency E55.9    Neuropathy G62.9    Relevant Orders    Referral to Occupational Therapy     Other Visit Diagnoses         Codes    Balance disorder     R26.89    Relevant Orders    Referral to Physical Therapy    Referral to Occupational Therapy    Dry eyes     H04.123    Relevant Medications    lubricating eye drops ophthalmic solution    H/O cold sores     Z86.19    Relevant Medications    lysine 500 mg tablet    Bilateral foot pain     M79.671, M79.672    Relevant Medications    diclofenac sodium (Voltaren) 1 % gel gel    Healthcare maintenance     Z00.00    Relevant Medications    magnesium oxide 500 mg capsule               The patient's DEXA bone density scan was reviewed (10/10/2023). She was diagnosed with osteopenia in the left femural neck, but her spine was normal.      The patient was advised to continue taking her vitamin supplements and med list updated w these    In accordance with her request, the patient received a referral to physical therapy. She also received referrals to occupational therapy (to adjust to new home) and speech therapy (for cognition).    Will  copy of summary and OV and med list and orders    Will let me know if needs cxr or lab for TB since pt converted in med school and was treated    The patient will follow-up in January 2024, unless otherwise needed.    Scribe Attestation  By signing my name below, I, James Medina   attest that this documentation has been prepared under the direction and in the presence of Nidhi Severino DO.

## 2023-11-30 ENCOUNTER — TELEPHONE (OUTPATIENT)
Dept: PRIMARY CARE | Facility: CLINIC | Age: 73
End: 2023-11-30
Payer: MEDICARE

## 2023-11-30 DIAGNOSIS — Z11.1 ENCOUNTER FOR SCREENING FOR RESPIRATORY TUBERCULOSIS: Primary | ICD-10-CM

## 2023-11-30 NOTE — TELEPHONE ENCOUNTER
Pts nursing home states she needs a chest x ray order put in pls. Thank you. Will update you on the DNR and med list when they call back tomorrow

## 2023-12-01 ENCOUNTER — TELEPHONE (OUTPATIENT)
Dept: PRIMARY CARE | Facility: CLINIC | Age: 73
End: 2023-12-01
Payer: MEDICARE

## 2024-01-10 ENCOUNTER — OFFICE VISIT (OUTPATIENT)
Dept: PRIMARY CARE | Facility: CLINIC | Age: 74
End: 2024-01-10
Payer: MEDICARE

## 2024-01-10 VITALS
BODY MASS INDEX: 39.27 KG/M2 | HEART RATE: 70 BPM | TEMPERATURE: 97.8 F | RESPIRATION RATE: 18 BRPM | HEIGHT: 60 IN | SYSTOLIC BLOOD PRESSURE: 137 MMHG | OXYGEN SATURATION: 93 % | WEIGHT: 200 LBS | DIASTOLIC BLOOD PRESSURE: 76 MMHG

## 2024-01-10 DIAGNOSIS — E55.9 MILD VITAMIN D DEFICIENCY: ICD-10-CM

## 2024-01-10 DIAGNOSIS — I10 PRIMARY HYPERTENSION: ICD-10-CM

## 2024-01-10 DIAGNOSIS — G47.33 OBSTRUCTIVE SLEEP APNEA SYNDROME: ICD-10-CM

## 2024-01-10 DIAGNOSIS — F03.B3 MODERATE DEMENTIA WITH MOOD DISTURBANCE, UNSPECIFIED DEMENTIA TYPE (MULTI): ICD-10-CM

## 2024-01-10 DIAGNOSIS — F32.5 MAJOR DEPRESSIVE DISORDER WITH SINGLE EPISODE, IN FULL REMISSION (CMS-HCC): ICD-10-CM

## 2024-01-10 DIAGNOSIS — F41.9 ANXIETY: ICD-10-CM

## 2024-01-10 DIAGNOSIS — I73.89 OTHER SPECIFIED PERIPHERAL VASCULAR DISEASES (CMS-HCC): ICD-10-CM

## 2024-01-10 DIAGNOSIS — M17.10 ARTHRITIS OF KNEE: ICD-10-CM

## 2024-01-10 DIAGNOSIS — E66.01 OBESITY, MORBID (MULTI): ICD-10-CM

## 2024-01-10 DIAGNOSIS — E78.5 HYPERLIPIDEMIA, UNSPECIFIED HYPERLIPIDEMIA TYPE: ICD-10-CM

## 2024-01-10 DIAGNOSIS — D72.819 LEUKOPENIA, UNSPECIFIED TYPE: Primary | ICD-10-CM

## 2024-01-10 PROCEDURE — 99215 OFFICE O/P EST HI 40 MIN: CPT | Performed by: FAMILY MEDICINE

## 2024-01-10 PROCEDURE — 3075F SYST BP GE 130 - 139MM HG: CPT | Performed by: FAMILY MEDICINE

## 2024-01-10 PROCEDURE — 1159F MED LIST DOCD IN RCRD: CPT | Performed by: FAMILY MEDICINE

## 2024-01-10 PROCEDURE — 3078F DIAST BP <80 MM HG: CPT | Performed by: FAMILY MEDICINE

## 2024-01-10 PROCEDURE — 1126F AMNT PAIN NOTED NONE PRSNT: CPT | Performed by: FAMILY MEDICINE

## 2024-01-10 PROCEDURE — 1036F TOBACCO NON-USER: CPT | Performed by: FAMILY MEDICINE

## 2024-01-10 ASSESSMENT — PATIENT HEALTH QUESTIONNAIRE - PHQ9
2. FEELING DOWN, DEPRESSED OR HOPELESS: NOT AT ALL
SUM OF ALL RESPONSES TO PHQ9 QUESTIONS 1 AND 2: 0
1. LITTLE INTEREST OR PLEASURE IN DOING THINGS: NOT AT ALL

## 2024-01-10 ASSESSMENT — PAIN SCALES - GENERAL: PAINLEVEL: 0-NO PAIN

## 2024-01-10 NOTE — ASSESSMENT & PLAN NOTE
Pt compliant with medication, has been in remission, questioning if medication helping as much as used to per pt although denies depression s/s.  Reevaluate 6 months

## 2024-01-10 NOTE — PROGRESS NOTES
Subjective   Patient ID: Tabby Morrell is a 73 y.o. female who presents for 4-6 wk f/up.    HPI   The patient presents to the clinic for a 4-6 week follow-up. She has past medical history of chronic diarrhea, anxiety, depression, arthritis of knee, Alzheimer's disease, lumbar canal stenosis, and SHANTEL.    The patient states that she primarily visited the clinic today to fill/sign DNR (Do not resuscitate) paperwork. She is accompanied today by a family member, her daughter Rosanna, who is 's health care power of atty.    The patient moved into assisted living recently. She has healthcare providers at the assisted living facility that are responsible for giving medication to the patient. She states that she needs to confirm that she is receiving all of her required medication. She reports that she is enjoying life at her new facility. She states that she sleeps well and denies any nightmares. Uses her cpap nightly and wakes up refreshed.  The patient states that she enjoys socializing with the other residents at the living facility. She also participates in physical therapy sessions (2x per week), occupational therapy sessions (2x per week), and speech therapy sessions (1x per week) at her facility.  She has her dog with her and walks regularly daily    The patient continues to take desvenlafaxine medication. She states that the medication is not working as well as before. She denies any side-effects to desvenlafaxine medication. She continues to take donepezil medication. She also states that this medication is not working as  well before. She denies any side-effects to donepezil medication. The patient believes that her medication was more potent when she would pick it up from [facility] (assisted living facility is now responsible for obtaining and giving medication to the patient).  Will look into if having different results from different generics    The patient reports symptoms of a nervous tic of  touching L side of head to where she has had bald spot.  Much better now since is moved and settled.  Had a lot of anxiety prior nonths to her move due to unable to take care of things as well as leaving her home. She states that she had these symptoms before she moved into her new assisted living facility. The patient's family member also reports that the patient experiences sudden mood changes.    Review of Systems    Objective   /76   Pulse 70   Temp 36.6 °C (97.8 °F)   Resp 18   Ht 1.524 m (5')   Wt 90.7 kg (200 lb)   SpO2 93%   BMI 39.06 kg/m²     Physical Exam  Constitutional:       Appearance: Normal appearance.   Neck:      Vascular: No carotid bruit.   Cardiovascular:      Rate and Rhythm: Normal rate and regular rhythm.      Pulses: Normal pulses.      Heart sounds: Normal heart sounds.   Pulmonary:      Effort: Pulmonary effort is normal.      Breath sounds: Normal breath sounds.   Musculoskeletal:         General: No tenderness. Normal range of motion.      Cervical back: Normal range of motion.      Right lower leg: No edema.      Left lower leg: No edema.   Skin:     General: Skin is warm and dry.   Neurological:      Mental Status: She is alert and oriented to person, place, and time.   Psychiatric:         Mood and Affect: Mood normal.         Thought Content: Thought content normal.         Assessment/Plan   Problem List Items Addressed This Visit             ICD-10-CM    Anxiety F41.9    Relevant Orders    TSH with reflex to Free T4 if abnormal    Arthritis of knee M17.10    Moderate dementia with mood disturbance, unspecified dementia type (CMS/HCC) F03.B3     Stable, compliant with medication, seeing neuro this week also         Hypertension I10    Relevant Orders    Comprehensive Metabolic Panel    Leukopenia - Primary D72.819    Relevant Orders    CBC and Auto Differential    Major depression in complete remission (CMS/HCC) F32.5     Pt compliant with medication, has been in  remission, questioning if medication helping as much as used to per pt although denies depression s/s.  Reevaluate 6 months         Mild vitamin D deficiency E55.9    Relevant Orders    Vitamin D 25-Hydroxy,Total (for eval of Vitamin D levels)    Obstructive sleep apnea syndrome G47.33    Obesity, morbid (CMS/[facility]) E66.01     [facility], changing diet and greatly increased exercise, reassess 6 month         Other specified peripheral vascular diseases (CMS/[facility]) I73.89     Asymptomatic, pt is doing PT and walking her dog a lot and is doing well          Other Visit Diagnoses         Codes    Hyperlipidemia, unspecified hyperlipidemia type     E78.5    Relevant Orders    Lipid Panel               In regards to the patient's concerns with the effectiveness of her desvenlafaxine and donepezil medications, she was informed that there are multiple different generic versions of these medications. She was advised to consult with healthcare providers at her facility to determine if she is receiving different generic versions of her medication (in comparison to when she picked up her medication from [facility]). Can order for her her old generic and see if feels better.  Daughter will speak to nursing supervisor to see how to do this    In regards to the patient's concerns of a nervous tic and sudden mood changes, she was advised that her symptoms are likely secondary to anxiety. The patient went through a major lifestyle change (new home) which likely contributed to her anxiety symptoms. Continue monitoring symptoms for exacerbation.  She is doing much better, she feels like the [facility] living place is her home and is happy there    The patient's DNR form was filled/signed as directed. A copy of the paperwork was taken for the records of the clinic.  Discussed thoroughly with her pt and her daughter today    Labs (CBC, CMP, lipid panel, TSH, vitamin D) were ordered for the patient. She intends to complete  her labs soon. The clinic will call the patient upon receiving her lab results.    Prospective immunizations were discussed with the patient. She is up-to-date on most of her immunizations (Flu, COVID-19 booster, Shingrix).    She will follow-up in August 2024 for medicare wellness unless needs to be seen sooner, unless otherwise needed.    Scribe Attestation  By signing my name below, IJonelle Scribe   attest that this documentation has been prepared under the direction and in the presence of Nidhi Severino DO.

## 2024-01-11 ENCOUNTER — OFFICE VISIT (OUTPATIENT)
Dept: NEUROLOGY | Facility: CLINIC | Age: 74
End: 2024-01-11
Payer: MEDICARE

## 2024-01-11 VITALS
HEART RATE: 70 BPM | DIASTOLIC BLOOD PRESSURE: 80 MMHG | SYSTOLIC BLOOD PRESSURE: 159 MMHG | RESPIRATION RATE: 18 BRPM | OXYGEN SATURATION: 97 %

## 2024-01-11 DIAGNOSIS — F32.5 MAJOR DEPRESSIVE DISORDER WITH SINGLE EPISODE, IN FULL REMISSION (CMS-HCC): ICD-10-CM

## 2024-01-11 DIAGNOSIS — G30.1 LATE ONSET ALZHEIMER'S DISEASE WITHOUT BEHAVIORAL DISTURBANCE (MULTI): Primary | ICD-10-CM

## 2024-01-11 DIAGNOSIS — F02.80 LATE ONSET ALZHEIMER'S DISEASE WITHOUT BEHAVIORAL DISTURBANCE (MULTI): Primary | ICD-10-CM

## 2024-01-11 PROCEDURE — 99215 OFFICE O/P EST HI 40 MIN: CPT | Performed by: PSYCHIATRY & NEUROLOGY

## 2024-01-11 PROCEDURE — 1036F TOBACCO NON-USER: CPT | Performed by: PSYCHIATRY & NEUROLOGY

## 2024-01-11 PROCEDURE — 1159F MED LIST DOCD IN RCRD: CPT | Performed by: PSYCHIATRY & NEUROLOGY

## 2024-01-11 PROCEDURE — 3077F SYST BP >= 140 MM HG: CPT | Performed by: PSYCHIATRY & NEUROLOGY

## 2024-01-11 PROCEDURE — 1160F RVW MEDS BY RX/DR IN RCRD: CPT | Performed by: PSYCHIATRY & NEUROLOGY

## 2024-01-11 PROCEDURE — 3079F DIAST BP 80-89 MM HG: CPT | Performed by: PSYCHIATRY & NEUROLOGY

## 2024-01-11 PROCEDURE — 1126F AMNT PAIN NOTED NONE PRSNT: CPT | Performed by: PSYCHIATRY & NEUROLOGY

## 2024-01-11 RX ORDER — DESVENLAFAXINE 100 MG/1
100 TABLET, EXTENDED RELEASE ORAL DAILY
Qty: 30 TABLET | Refills: 3 | Status: SHIPPED | OUTPATIENT
Start: 2024-01-11

## 2024-01-11 NOTE — PROGRESS NOTES
[place], [place]      Department of Neurology  [facility]     FU1 Consultation    Dr. 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  Information is from the patient, anju (Alejandrina), and the EMR.  HPI: : Pt was referred for Neuropsych eval due to memory issues and her Dad's having had AD.  : Pt struggles to recall first symptoms, now struggles to recall first symptoms but it seems word-finding difficulty predominated.  :  Pt and anju report her spirits have improved but still with word-finding struggles and misplacing or finding things she planned to do.  : Recent driving eval led to being told to stop driving, but she persists. : She has PT at her [facility] w/ ST/PT/OT; her dog Omar.   Trouble with all of their names, .    Med Hx   Allergies: ecASAàanaphylaxis, NSAIDs, shellfish  Rx:  [id number], [id number], [id number], CPAP  H/O: HTN, depression, anxiety, SHANTEL, urinary urgency   arthritis, LBP/lumbar stenosis, osteoporosis, +PPD  S/P: cholecystx, hysterx, rib fxs from trauma, Lt knee replacement (Rt scheduled)  FHx: Dad AD, HTN;   Mom CLL, HTN  PSHx: mendez Velez, graduate [employer]; single in [facility]; uses CPAP, exercises, stopped driving   ROS: Genl: Skin-Skel Lt foot pain Cardio-pulm nl      U/L-GI nl     Neuro:  Chronic LBP, neck pain, HAs;  w/o CVA, TIA, TMB, VBI   Exam: DF=561/80, HR=70, RR=18, va=162  V4oaw=81%  General:   lungs w/ clear w/ good air mvmt,   RRR w/o MRG,  full carotids w/o T/B      Abdo soft +BS no bruit;   w/ mild dependent lymphedema   Neuro   MS:  calm w/ wnl affect range;  no hallucins, delus/agitation   CN:  FEOM w/o nystagmus or ptosis Face symm S&E    Motor: strong & steady w/ wnl tone symm  w/o invol mvmts   MSRs: +2 but for +1 AJs   w/o clonus   w/o spread   Sensation:  symm  LT, cold, and vib on face, > hands, >> legs   -Romberg, symm sway ~1cm     Coordination: FT fast w/ reg pace bilat     FNF accurate & symm to fixed target     Gait: slow & unsteady, short stride,  little armswing  3 step turn, unsteady tandem   Cognitive:  RH;  Language  recept: answers & follows;  express:  full sents w/o paraphasias  Some misnaming but no overt word finding diffic: no pauses   Naming:   names:  body parts=3/3   objects=3/3      Praxis:  pen twirling fast & symm (lt took practice)  w/o synkinesia   Mem:  USpresident correcxt, my name correct   Executive:  Stroop names=9/9 correct    colors=2/9 errors, 1 self-corrected  Prev MoCA HS +1=15/30  c/w mod impair   GDS: 1/15  not suggest mood disorder  Labs (to 19-May-2022):  wbc=4.4,  hct=46.6,  ojx=808   glu=79, bun/crt=13/69, GFR>90,  AST=31  ALT=29, AlkP=87  TSH=1.82     cwvs=353 , hdl=87 , clm=442, chol/hdl=2.4  N49=5256, fol>24  D3=28  Neuropsych ([place], 29-Sep-2022):  Refd Jean-Paul 2019 as 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 A blind MoCA=17/22.  c/w frontal-subcort dysfunc w/ deficits in exec func, speed, AD less likely.  Spine MRI (16-Dec-2019): lumbar spondylosis, severe canal stenosis, bilat forami stenois L4/5  Brain MRI (3-Apr-2019): chronic sml vsl ischemic changes, cerebral vol approp to age  PET (7-Oct-2022): Prominent asym hypometabolism in Lt parietal and temporal lobe, a pattern suggestive of Alzheimer's disease in the appropriate clinical setting.  A&P:  Summary 4y memory decline: word-finding diffic then losing things.  Genl notable for obesity & mild distal lymphedema.  Neuro exam w/ LE areflexia but less and gait instability then before.  Cognitive exam w/o overt word-finding diffic, slow reading, mild difficulty w/ color naming in Stroop.  Prev MoCA=15/30, GDS=1/15.  Labs unremarkable.  Neuropsych. Eval c/w frt subcort dysfunc; AD vs subcort demenita.  Brain MRI (2019) showed sml vsl dis and loss of cerebral volume.  PET (2022)  showed Lt parietotemporal hypometabolism suggestive of AD. Interpret:  Course c/w mild AD.  Again recc cane or stick, to little avail.  Plan:  Meds appro & well-tolerated.   F/U:  I discussed these matters with the pt and anju w/ appro questions and good understanding.  I will arrange RTC in 6 months.  I encouraged interim contact if issues arise.   Thank you for allowing me to participate in the care of your patient.   Sincerely yours, Parvez Guan M.D., Ph.D.

## 2024-02-07 DIAGNOSIS — M79.672 BILATERAL FOOT PAIN: ICD-10-CM

## 2024-02-07 DIAGNOSIS — R26.89 BALANCE DISORDER: Primary | ICD-10-CM

## 2024-02-07 DIAGNOSIS — M21.962 FOOT DEFORMITY, ACQUIRED, LEFT: ICD-10-CM

## 2024-02-07 DIAGNOSIS — M79.671 BILATERAL FOOT PAIN: ICD-10-CM

## 2024-02-12 ENCOUNTER — TELEPHONE (OUTPATIENT)
Dept: PRIMARY CARE | Facility: CLINIC | Age: 74
End: 2024-02-12
Payer: MEDICARE

## 2024-02-12 NOTE — TELEPHONE ENCOUNTER
Sherri called asking for something for patient with sinus congestion it started on Saturday she was tested for covid today negative result please advise.

## 2024-02-22 ENCOUNTER — TELEPHONE (OUTPATIENT)
Dept: PRIMARY CARE | Facility: CLINIC | Age: 74
End: 2024-02-22
Payer: MEDICARE

## 2024-02-22 DIAGNOSIS — R05.3 PERSISTENT COUGH: Primary | ICD-10-CM

## 2024-02-22 NOTE — TELEPHONE ENCOUNTER
PATIENT HAS A NON PRODUCTIVE COUGH IT'S VERY DEEP THEY WOULD LIKE AN ORDER FOR CHEST XRAY PLEASE ADVISE.

## 2024-02-23 ENCOUNTER — TELEPHONE (OUTPATIENT)
Dept: PRIMARY CARE | Facility: CLINIC | Age: 74
End: 2024-02-23
Payer: MEDICARE

## 2024-02-23 NOTE — TELEPHONE ENCOUNTER
They sent us her xray from yesterday patient has no sob her vitals are good no recent infection just a deep cough that is why the xray was ordered patient tested negative for covid murray grand wants to know if you want to do anything about the rest of the chest xray. Please advise.

## 2024-05-16 ENCOUNTER — OFFICE VISIT (OUTPATIENT)
Dept: PRIMARY CARE | Facility: CLINIC | Age: 74
End: 2024-05-16
Payer: MEDICARE

## 2024-05-16 VITALS
SYSTOLIC BLOOD PRESSURE: 133 MMHG | TEMPERATURE: 97.6 F | WEIGHT: 221 LBS | HEART RATE: 62 BPM | DIASTOLIC BLOOD PRESSURE: 81 MMHG | BODY MASS INDEX: 43.39 KG/M2 | HEIGHT: 60 IN | RESPIRATION RATE: 18 BRPM | OXYGEN SATURATION: 93 %

## 2024-05-16 DIAGNOSIS — G47.33 OBSTRUCTIVE SLEEP APNEA SYNDROME: ICD-10-CM

## 2024-05-16 DIAGNOSIS — E55.9 MILD VITAMIN D DEFICIENCY: ICD-10-CM

## 2024-05-16 DIAGNOSIS — F03.B3 MODERATE DEMENTIA WITH MOOD DISTURBANCE, UNSPECIFIED DEMENTIA TYPE (MULTI): ICD-10-CM

## 2024-05-16 DIAGNOSIS — F41.9 ANXIETY: ICD-10-CM

## 2024-05-16 DIAGNOSIS — I10 PRIMARY HYPERTENSION: Primary | ICD-10-CM

## 2024-05-16 DIAGNOSIS — F32.5 MAJOR DEPRESSIVE DISORDER WITH SINGLE EPISODE, IN FULL REMISSION (CMS-HCC): ICD-10-CM

## 2024-05-16 PROBLEM — F32.9 DEPRESSION, MAJOR: Status: RESOLVED | Noted: 2023-06-07 | Resolved: 2024-05-16

## 2024-05-16 PROCEDURE — 1123F ACP DISCUSS/DSCN MKR DOCD: CPT | Performed by: FAMILY MEDICINE

## 2024-05-16 PROCEDURE — 1159F MED LIST DOCD IN RCRD: CPT | Performed by: FAMILY MEDICINE

## 2024-05-16 PROCEDURE — 1157F ADVNC CARE PLAN IN RCRD: CPT | Performed by: FAMILY MEDICINE

## 2024-05-16 PROCEDURE — 1126F AMNT PAIN NOTED NONE PRSNT: CPT | Performed by: FAMILY MEDICINE

## 2024-05-16 PROCEDURE — 99214 OFFICE O/P EST MOD 30 MIN: CPT | Performed by: FAMILY MEDICINE

## 2024-05-16 PROCEDURE — 3079F DIAST BP 80-89 MM HG: CPT | Performed by: FAMILY MEDICINE

## 2024-05-16 PROCEDURE — 3075F SYST BP GE 130 - 139MM HG: CPT | Performed by: FAMILY MEDICINE

## 2024-05-16 ASSESSMENT — PAIN SCALES - GENERAL: PAINLEVEL: 0-NO PAIN

## 2024-05-16 ASSESSMENT — PATIENT HEALTH QUESTIONNAIRE - PHQ9
SUM OF ALL RESPONSES TO PHQ9 QUESTIONS 1 AND 2: 0
2. FEELING DOWN, DEPRESSED OR HOPELESS: NOT AT ALL
1. LITTLE INTEREST OR PLEASURE IN DOING THINGS: NOT AT ALL

## 2024-05-16 NOTE — PROGRESS NOTES
Subjective   Patient ID: Tabby Morrell is a 73 y.o. female who presents for f/up.    HPI   The patient presents to the clinic for a follow-up. She has past medical history of HTN, peripheral vascular diseases, mild vitamin D deficiency, chronic diarrhea, calcium kidney stones, left renal calculus, urinary incontinence, anxiety, depression, arthritis of knee, low back pain, daytime somnolence, moderate dementia, lumbar canal stenosis, mild cognitive impairment, memory loss, neuropathy, and SHANTEL.    She is accompanied by a family member in the clinic today.  Her daughter    She continues on desvenlafaxine 100 mg daily for treatment of depression/anxiety symptoms. She states that desvenlafaxine medication is working well. Her mood has been good. She denies any side-effects to her desvenlafaxine medication.  Anxiety is controlled.  She is enjoying her activities at senior living    She also takes Namenda 10 mg (2 times daily) for treatment of memory loss. She states that Namenda medication is working well for treatment of this condition. She denies any side-effects to her Namenda medication.  Neither feel her dementia has progressed recently.  She follows w neuro    Her most recent mammogram screening and DEXA bone density scan were done in October 2023. Her most recent colonoscopy screening was done in April 2023 (5-year recall).    She lives in an assisted living facility and she receives regular medication refills and all were sent recently when I signed orders faxed by [facility].      Her blood pressure (133/81) was slightly elevated when checked in the clinic today. She denies any edema in her BLE.  She has bp checked in facility and has been very good range.  No cp or sob, she has been staying active w/o problem    She reports fluctuating symptoms of mild left knee pain (previous surgical procedure performed on this knee). She denies any pain in her right knee.  Foot pain has resolved  taking her vit D3  supplement    Using her cpap regularly w/o problem    Review of Systems    Objective   /81   Pulse 62   Temp 36.4 °C (97.6 °F)   Resp 18   Ht 1.524 m (5')   Wt 100 kg (221 lb)   SpO2 93%   BMI 43.16 kg/m²     Physical Exam  Constitutional:       Appearance: Normal appearance.   Neck:      Vascular: No carotid bruit.      Comments: No thyromegaly  Cardiovascular:      Rate and Rhythm: Normal rate and regular rhythm.      Pulses: Normal pulses.      Heart sounds: Normal heart sounds.   Pulmonary:      Effort: Pulmonary effort is normal.      Breath sounds: Normal breath sounds.   Musculoskeletal:         General: Normal range of motion.      Cervical back: Normal range of motion.   Skin:     General: Skin is warm and dry.   Neurological:      Mental Status: She is alert. Mental status is at baseline.      Gait: Gait normal.      Comments: Oriented to person and place   Psychiatric:         Mood and Affect: Mood normal.         Behavior: Behavior normal.         Thought Content: Thought content normal.         Assessment/Plan   Problem List Items Addressed This Visit             ICD-10-CM    Anxiety F41.9    Moderate dementia with mood disturbance, unspecified dementia type (Multi) F03.B3    Hypertension - Primary I10    Major depression in complete remission (CMS-HCC) F32.5    Mild vitamin D deficiency E55.9    Obstructive sleep apnea syndrome G47.33          Labs (CBC, CMP, lipid panel, TSH, vitamin D) were ordered for the patient in January 2024. The lab orders remain active. She intends to complete her labs soon. The clinic will contact the patient upon receiving her lab results.    She will have a repeat mammogram and DEXA bone density scan in October 2024.    She will follow-up in August 2024 for a medicare wellness visit, unless otherwise needed.    If meds need ordered a different way daughter will let me know    She is seeing neuro in July    Scribe Attestation  By signing my name below, I,  James Medina   attest that this documentation has been prepared under the direction and in the presence of Nidhi Severino DO.

## 2024-06-07 ENCOUNTER — TELEPHONE (OUTPATIENT)
Dept: PRIMARY CARE | Facility: CLINIC | Age: 74
End: 2024-06-07
Payer: MEDICARE

## 2024-06-11 ENCOUNTER — LAB (OUTPATIENT)
Dept: LAB | Facility: LAB | Age: 74
End: 2024-06-11
Payer: MEDICARE

## 2024-06-11 DIAGNOSIS — F41.9 ANXIETY: ICD-10-CM

## 2024-06-11 DIAGNOSIS — E78.5 HYPERLIPIDEMIA, UNSPECIFIED HYPERLIPIDEMIA TYPE: ICD-10-CM

## 2024-06-11 DIAGNOSIS — I10 PRIMARY HYPERTENSION: ICD-10-CM

## 2024-06-11 DIAGNOSIS — D72.819 LEUKOPENIA, UNSPECIFIED TYPE: ICD-10-CM

## 2024-06-11 LAB
ALBUMIN SERPL BCP-MCNC: 4.2 G/DL (ref 3.4–5)
ALP SERPL-CCNC: 91 U/L (ref 33–136)
ALT SERPL W P-5'-P-CCNC: 15 U/L (ref 7–45)
ANION GAP SERPL CALC-SCNC: 9 MMOL/L (ref 10–20)
AST SERPL W P-5'-P-CCNC: 20 U/L (ref 9–39)
BASOPHILS # BLD AUTO: 0.07 X10*3/UL (ref 0–0.1)
BASOPHILS NFR BLD AUTO: 1.6 %
BILIRUB SERPL-MCNC: 0.6 MG/DL (ref 0–1.2)
BUN SERPL-MCNC: 16 MG/DL (ref 6–23)
CALCIUM SERPL-MCNC: 9.2 MG/DL (ref 8.6–10.3)
CHLORIDE SERPL-SCNC: 106 MMOL/L (ref 98–107)
CHOLEST SERPL-MCNC: 193 MG/DL (ref 0–199)
CHOLESTEROL/HDL RATIO: 2.5
CO2 SERPL-SCNC: 30 MMOL/L (ref 21–32)
CREAT SERPL-MCNC: 0.67 MG/DL (ref 0.5–1.05)
EGFRCR SERPLBLD CKD-EPI 2021: >90 ML/MIN/1.73M*2
EOSINOPHIL # BLD AUTO: 0.18 X10*3/UL (ref 0–0.4)
EOSINOPHIL NFR BLD AUTO: 4.1 %
ERYTHROCYTE [DISTWIDTH] IN BLOOD BY AUTOMATED COUNT: 13 % (ref 11.5–14.5)
GLUCOSE SERPL-MCNC: 87 MG/DL (ref 74–99)
HCT VFR BLD AUTO: 43.4 % (ref 36–46)
HDLC SERPL-MCNC: 77.1 MG/DL
HGB BLD-MCNC: 14.2 G/DL (ref 12–16)
IMM GRANULOCYTES # BLD AUTO: 0.01 X10*3/UL (ref 0–0.5)
IMM GRANULOCYTES NFR BLD AUTO: 0.2 % (ref 0–0.9)
LDLC SERPL CALC-MCNC: 99 MG/DL
LYMPHOCYTES # BLD AUTO: 2.04 X10*3/UL (ref 0.8–3)
LYMPHOCYTES NFR BLD AUTO: 45.9 %
MCH RBC QN AUTO: 30.7 PG (ref 26–34)
MCHC RBC AUTO-ENTMCNC: 32.7 G/DL (ref 32–36)
MCV RBC AUTO: 94 FL (ref 80–100)
MONOCYTES # BLD AUTO: 0.44 X10*3/UL (ref 0.05–0.8)
MONOCYTES NFR BLD AUTO: 9.9 %
NEUTROPHILS # BLD AUTO: 1.7 X10*3/UL (ref 1.6–5.5)
NEUTROPHILS NFR BLD AUTO: 38.3 %
NON HDL CHOLESTEROL: 116 MG/DL (ref 0–149)
NRBC BLD-RTO: 0 /100 WBCS (ref 0–0)
PLATELET # BLD AUTO: 244 X10*3/UL (ref 150–450)
POTASSIUM SERPL-SCNC: 4.2 MMOL/L (ref 3.5–5.3)
PROT SERPL-MCNC: 6.2 G/DL (ref 6.4–8.2)
RBC # BLD AUTO: 4.63 X10*6/UL (ref 4–5.2)
SODIUM SERPL-SCNC: 141 MMOL/L (ref 136–145)
TRIGL SERPL-MCNC: 86 MG/DL (ref 0–149)
TSH SERPL-ACNC: 2.96 MIU/L (ref 0.44–3.98)
VLDL: 17 MG/DL (ref 0–40)
WBC # BLD AUTO: 4.4 X10*3/UL (ref 4.4–11.3)

## 2024-06-11 PROCEDURE — 85025 COMPLETE CBC W/AUTO DIFF WBC: CPT

## 2024-06-11 PROCEDURE — 84443 ASSAY THYROID STIM HORMONE: CPT

## 2024-06-11 PROCEDURE — 80061 LIPID PANEL: CPT

## 2024-06-11 PROCEDURE — 80053 COMPREHEN METABOLIC PANEL: CPT

## 2024-06-11 PROCEDURE — 36415 COLL VENOUS BLD VENIPUNCTURE: CPT

## 2024-06-12 ENCOUNTER — OFFICE VISIT (OUTPATIENT)
Dept: PRIMARY CARE | Facility: CLINIC | Age: 74
End: 2024-06-12
Payer: MEDICARE

## 2024-06-12 ENCOUNTER — HOSPITAL ENCOUNTER (OUTPATIENT)
Dept: RADIOLOGY | Facility: CLINIC | Age: 74
Discharge: HOME | End: 2024-06-12
Payer: MEDICARE

## 2024-06-12 VITALS
OXYGEN SATURATION: 95 % | RESPIRATION RATE: 18 BRPM | HEIGHT: 61 IN | TEMPERATURE: 97.7 F | WEIGHT: 219 LBS | BODY MASS INDEX: 41.35 KG/M2 | HEART RATE: 65 BPM | DIASTOLIC BLOOD PRESSURE: 77 MMHG | SYSTOLIC BLOOD PRESSURE: 136 MMHG

## 2024-06-12 DIAGNOSIS — F32.5 MAJOR DEPRESSIVE DISORDER WITH SINGLE EPISODE, IN FULL REMISSION (CMS-HCC): ICD-10-CM

## 2024-06-12 DIAGNOSIS — F41.9 ANXIETY: ICD-10-CM

## 2024-06-12 DIAGNOSIS — L29.9 PRURITUS: ICD-10-CM

## 2024-06-12 DIAGNOSIS — G89.29 CHRONIC RIGHT-SIDED LOW BACK PAIN WITH RIGHT-SIDED SCIATICA: ICD-10-CM

## 2024-06-12 DIAGNOSIS — G47.33 OBSTRUCTIVE SLEEP APNEA SYNDROME: ICD-10-CM

## 2024-06-12 DIAGNOSIS — F03.B3 MODERATE DEMENTIA WITH MOOD DISTURBANCE, UNSPECIFIED DEMENTIA TYPE (MULTI): ICD-10-CM

## 2024-06-12 DIAGNOSIS — M54.41 CHRONIC RIGHT-SIDED LOW BACK PAIN WITH RIGHT-SIDED SCIATICA: Primary | ICD-10-CM

## 2024-06-12 DIAGNOSIS — G89.29 CHRONIC RIGHT-SIDED LOW BACK PAIN WITH RIGHT-SIDED SCIATICA: Primary | ICD-10-CM

## 2024-06-12 DIAGNOSIS — M17.10 ARTHRITIS OF KNEE: ICD-10-CM

## 2024-06-12 DIAGNOSIS — M54.41 CHRONIC RIGHT-SIDED LOW BACK PAIN WITH RIGHT-SIDED SCIATICA: ICD-10-CM

## 2024-06-12 DIAGNOSIS — I10 PRIMARY HYPERTENSION: ICD-10-CM

## 2024-06-12 PROCEDURE — 1036F TOBACCO NON-USER: CPT | Performed by: FAMILY MEDICINE

## 2024-06-12 PROCEDURE — 72110 X-RAY EXAM L-2 SPINE 4/>VWS: CPT | Performed by: RADIOLOGY

## 2024-06-12 PROCEDURE — 1157F ADVNC CARE PLAN IN RCRD: CPT | Performed by: FAMILY MEDICINE

## 2024-06-12 PROCEDURE — 3075F SYST BP GE 130 - 139MM HG: CPT | Performed by: FAMILY MEDICINE

## 2024-06-12 PROCEDURE — 72110 X-RAY EXAM L-2 SPINE 4/>VWS: CPT

## 2024-06-12 PROCEDURE — 1126F AMNT PAIN NOTED NONE PRSNT: CPT | Performed by: FAMILY MEDICINE

## 2024-06-12 PROCEDURE — 3078F DIAST BP <80 MM HG: CPT | Performed by: FAMILY MEDICINE

## 2024-06-12 PROCEDURE — 1159F MED LIST DOCD IN RCRD: CPT | Performed by: FAMILY MEDICINE

## 2024-06-12 PROCEDURE — 99214 OFFICE O/P EST MOD 30 MIN: CPT | Performed by: FAMILY MEDICINE

## 2024-06-12 PROCEDURE — 1123F ACP DISCUSS/DSCN MKR DOCD: CPT | Performed by: FAMILY MEDICINE

## 2024-06-12 ASSESSMENT — PATIENT HEALTH QUESTIONNAIRE - PHQ9
1. LITTLE INTEREST OR PLEASURE IN DOING THINGS: NOT AT ALL
2. FEELING DOWN, DEPRESSED OR HOPELESS: NOT AT ALL
SUM OF ALL RESPONSES TO PHQ9 QUESTIONS 1 AND 2: 0

## 2024-06-12 ASSESSMENT — PAIN SCALES - GENERAL: PAINLEVEL: 0-NO PAIN

## 2024-06-12 NOTE — PROGRESS NOTES
Subjective   Patient ID: Tabby Morrell is a 73 y.o. female who presents for f/up swollen ankles and pain in back of leg.    HPI   The patient presents to the clinic for a follow-up (swollen ankles and right-sided pain). She has past medical history of hypertension, peripheral vascular diseases, mild vitamin D deficiency, chronic diarreha, calcium kidney stones, urinary incontinence, anxiety, depression, arthritis of knee, low back pain, daytime somnolence, moderate dementia, lumbar canal stenosis, mild cognitive impairment, memory loss, neuropathy, and SHANTEL.    She is accompanied by a family member in the clinic today.  Her daughter    The patient had labs done on 6/11/2024 and requests to have them reviewed.    The patient continues to suffer from pain on the right-side of her body (right-sided back pain that radiates down the right leg and up the right shoulder). Notably, the patient has been consulting with an orthopaedic specialist ([facility]) for right knee pain recently. She has been using non-surgical intervention treatment methods (physical therapy, steroid injections, etc) for treatment of right knee pain symptoms. She intends to follow up with her specialist in July 2024 to evaluate if she should consider a right knee replacement for treatment of these symptoms.  She had L replaced in the past.  She is living in  living, she had been doing PT regularly and now is not doing the exercises as often since PT ended, it sounds as if PT was helpful when pt was doing it.  She is walking a lot, she has 6000 steps in so far today    The patient reports that she has been suffering from watery eyes. Notably, she has not been using her lubricating eye drops recently. Question if has seasonal allergies since pollen counts have been high    She continues to suffer from swelling in her ankles. The patient states that she walks often (~6626-9965 steps per day). She does not elevate her legs when sitting, she is not  "watching salt in diet although does not use salt at table often.  She is not using compression stockings to help.  Swelling is mild today, she states this is about worse that she gets.      The patient takes an Ocuvite with Lutein vitamin supplement regularly. She inquires if she can take a multivitamin in addition to this supplement.    She reports itchiness throughout her body. She wonders if these symptoms are associated with her soap/body wash and/or laundry detergent.  Daughter is going to do her laundry since the person at her facility who was doing it is no longer there, will use hypoallergenic detergent and see if helps at all.  No rash    Review of Systems    Objective   /77   Pulse 65   Temp 36.5 °C (97.7 °F)   Resp 18   Ht 1.549 m (5' 1\")   Wt 99.3 kg (219 lb)   SpO2 95%   BMI 41.38 kg/m²     Physical Exam  Neck:      Vascular: No carotid bruit.   Cardiovascular:      Rate and Rhythm: Normal rate and regular rhythm.      Pulses: Normal pulses.      Heart sounds: Normal heart sounds.   Pulmonary:      Effort: Pulmonary effort is normal.      Breath sounds: Normal breath sounds.   Musculoskeletal:         General: Normal range of motion.      Cervical back: Normal range of motion and neck supple.      Comments: Mild bilat ankle edema   Skin:     General: Skin is warm and dry.   Neurological:      Mental Status: She is alert.   Psychiatric:         Mood and Affect: Mood normal.         Behavior: Behavior normal.         Thought Content: Thought content normal.         Assessment/Plan   Problem List Items Addressed This Visit             ICD-10-CM    Anxiety F41.9    Arthritis of knee M17.10    Moderate dementia with mood disturbance, unspecified dementia type (Multi) F03.B3    Hypertension I10    Low back pain - Primary M54.50    Relevant Orders    XR lumbar spine complete 4+ views    Major depression in complete remission (CMS-HCC) F32.5    Obstructive sleep apnea syndrome G47.33     Other " Visit Diagnoses         Codes    Pruritus     L29.9               The patient's labs (6/11/2024) were reviewed. Blood count was WNL. Chemistries were WNL with FBS of 87. Electrolytes were mostly normal, but anion gap (9) was slightly below normal range. Kidney function markers and liver enzymes were WNL. Her total protein (6.2) was slightly below normal range. Cholesterol profile was WNL (total cholesterol 193/HDL 77.1/LDL 99/triglycerides 86). Her TSH (2.96) was within normal range. She was instructed to continue eating a healthy diet with regular exercise.    The patient was advised to take a multivitamin in the morning (with food) and to take her Ocuvite with Lutein supplement in the evening.    In regards to concerns with right-sided pain in her body, an x-ray of the lumbar spine was ordered for the patient for further evaluation. The clinic will contact the patient upon receiving her imaging results. In addition, the patient was advised to consult with her orthopaedic specialist regarding surgical intervention for treatment of right knee pain, question if her gait with knee pain is coausing some of her s/s as well. Continue monitoring symptoms for improvement/exacerbation.  Question if can restart PT, unsure when finished and if long enough for medicare to cover    In regards to concerns with watery eyes, the patient was advised to utilize her lubricating eye drops as previously directed. Continue monitoring symptoms for improvement/exacerbation.  If not improved then will use allergy medication as well, she will also schedule her yearly eye exam    In regards to concerns with swelling in the bilateral ankles, the patient was advised that this condition is likely dependent edema (as patient's kidney function is normal). She was instructed to continue walking frequently. She was also advised to eat a low-sodium diet. She was advised to try compression stockings and to keep her feet elevated (while seated) to  further alleviate symptoms. Continue monitoring symptoms for improvement/exacerbation.    In regards to concerns with itching symptoms all over the body, treatment options (alternative soap, alternative detergent, medication) were discussed with the patient. For now, she will attempt to use alternative soap and laundry detergent (hypoallergenic) to determine if these changes are able to alleviate her symptoms. She was advised to continue monitoring symptoms for improvement upon making these changes.  Can use low dose Zyrtec at night or benedryl to see if helpful also    Scribe Attestation  By signing my name below, I, Jonelle Ortiz , Scribe   attest that this documentation has been prepared under the direction and in the presence of Nidhi Severino DO.

## 2024-07-02 ENCOUNTER — TELEPHONE (OUTPATIENT)
Dept: PRIMARY CARE | Facility: CLINIC | Age: 74
End: 2024-07-02
Payer: MEDICARE

## 2024-07-02 NOTE — TELEPHONE ENCOUNTER
----- Message from Nidhi Severino DO sent at 6/28/2024  2:27 PM EDT -----  Report to pt and her daughter that kristi back xray shows arthritic / degenerative changes.  He is having knee replacement and if doing this does not help pain by changing the way she walks can then refer to pain mgmt to see if will help.  When able per Medicare can restart PT since that seemed to help her when she was doing it.

## 2024-07-15 DIAGNOSIS — B35.4 TINEA CORPORIS: Primary | ICD-10-CM

## 2024-07-15 RX ORDER — NYSTATIN 100000 U/G
CREAM TOPICAL 2 TIMES DAILY
Qty: 30 G | Refills: 0 | Status: SHIPPED | OUTPATIENT
Start: 2024-07-15 | End: 2024-07-22

## 2024-07-18 ENCOUNTER — OFFICE VISIT (OUTPATIENT)
Dept: NEUROLOGY | Facility: CLINIC | Age: 74
End: 2024-07-18
Payer: MEDICARE

## 2024-07-18 VITALS
HEART RATE: 61 BPM | SYSTOLIC BLOOD PRESSURE: 145 MMHG | DIASTOLIC BLOOD PRESSURE: 65 MMHG | TEMPERATURE: 97.1 F | RESPIRATION RATE: 17 BRPM | BODY MASS INDEX: 41.06 KG/M2 | WEIGHT: 217.3 LBS

## 2024-07-18 DIAGNOSIS — G30.1 LATE ONSET ALZHEIMER'S DISEASE WITHOUT BEHAVIORAL DISTURBANCE (MULTI): Primary | ICD-10-CM

## 2024-07-18 DIAGNOSIS — F02.80 LATE ONSET ALZHEIMER'S DISEASE WITHOUT BEHAVIORAL DISTURBANCE (MULTI): Primary | ICD-10-CM

## 2024-07-18 PROCEDURE — 1036F TOBACCO NON-USER: CPT | Performed by: PSYCHIATRY & NEUROLOGY

## 2024-07-18 PROCEDURE — 99215 OFFICE O/P EST HI 40 MIN: CPT | Performed by: PSYCHIATRY & NEUROLOGY

## 2024-07-18 PROCEDURE — 1123F ACP DISCUSS/DSCN MKR DOCD: CPT | Performed by: PSYCHIATRY & NEUROLOGY

## 2024-07-18 PROCEDURE — 1159F MED LIST DOCD IN RCRD: CPT | Performed by: PSYCHIATRY & NEUROLOGY

## 2024-07-18 PROCEDURE — 3077F SYST BP >= 140 MM HG: CPT | Performed by: PSYCHIATRY & NEUROLOGY

## 2024-07-18 PROCEDURE — 1157F ADVNC CARE PLAN IN RCRD: CPT | Performed by: PSYCHIATRY & NEUROLOGY

## 2024-07-18 PROCEDURE — 3078F DIAST BP <80 MM HG: CPT | Performed by: PSYCHIATRY & NEUROLOGY

## 2024-07-18 ASSESSMENT — ENCOUNTER SYMPTOMS
OCCASIONAL FEELINGS OF UNSTEADINESS: 1
DEPRESSION: 0
LOSS OF SENSATION IN FEET: 0

## 2024-07-18 ASSESSMENT — PATIENT HEALTH QUESTIONNAIRE - PHQ9
2. FEELING DOWN, DEPRESSED OR HOPELESS: NOT AT ALL
1. LITTLE INTEREST OR PLEASURE IN DOING THINGS: NOT AT ALL
SUM OF ALL RESPONSES TO PHQ9 QUESTIONS 1 AND 2: 0

## 2024-07-18 NOTE — PROGRESS NOTES
[place], [place]      Department of Neurology  [facility]     FU Consultation    PCP: Dr. Nidhi Betancourt       2024  Re: Tabby Morrell, DO    : 1950  MRN [id number]    CC: 74 yo woman with mixed dementia. Info per pt, anju (Alejandrina [place] here 1 wk/ month), EMR.  HPI: : Pt was referred for Neuropsych eval due to memory issues and her Dad's having had AD.  : Pt struggles to recall first symptoms, now struggles to recall first symptoms but it seems word-finding difficulty predominated.  :  Pt and anju report her spirits have improved but still with word-finding struggles and misplacing or finding things she planned to do.  : Recent driving eval led to being told to stop driving, but she persists. : She has PT at her [facility] w/ ST[facility]PT/OT; her dog Omar.   Trouble with all of their names,  STM worsening.    Med Hx   Allergies: ecASAàanaphylaxis, NSAIDs, shellfish  Rx:  [id number], [id number], [id number], CPAP  H/O: HTN, depression, anxiety, SHANTEL, urinary urgency   arthritis, LBP/lumbar stenosis, osteoporosis, +PPD  S/P: cholecystx, hysterx, rib fxs from trauma, Lt knee replacement (Rt scheduled)  FHx: Dad AD, HTN;   Mom CLL, HTN  PSHx: mendez Velez, graduate [facility] [employer] [facility] [employer]; [facility] in [facility]; uses CPAP, exercises, stopped driving   ROS: Genl: Skin-Skel Lt foot pain Cardio-pulm nl      U/L-GI nl     Neuro:  [id number] LBP, neck pain, HAs;  w/o CVA, TIA, TMB, VBI   Exam: SJ=400/65, HR=61, RR=17, pq=989  B7eps=49%  General:   lungs w/ clear w/ good air mvmt,   RRR w/o MRG,  full carotids w/o T/B      Abdo soft +BS no bruit;   w/ mild dependent lymphedema   Neuro   MS:  calm w/ wnl affect range;  no hallucins, delus/agitation   CN:  FEOM w/o nystagmus or ptosis Face symm S&E    Motor: strong & steady w/ wnl tone symm  w/o invol mvmts   MSRs: wnl UEs, none in LEs  w/o clonus  w/o spread   Sensation:  symm LT, cold, and vib on face, >  hands, > legs   Coordination: FT fast w/ reg pace bilat   FNF accurate    Gait: slow & unsteady, short stride,  no armswing  3 step turn, did not test tandem   Cognitive:  RH;  Language  recept: answers & follows;  express:  full sents w/o paraphasias  No misnaming or evident word finding diffic    Praxis:  pen twirling fast & symm (lt took practice)  w/o synkinesia  Prev MoCA HS +1=15/30  c/w mod impair   GDS: 1/15  not suggest mood disorder  Labs (to 6-):  wbc=4.4,  hct=43.4,  pqm=531   glu=79, bun/crt=13/69, GFR>90,  AST=31  ALT=29, AlkP=87  TSH=2.96     gghs=173, hdl=77.1, ldl=99, chol/hdl=2.5, VLDL=77, TG=86    I00=9322, fol>24, D3=28  Neuropsych ([place], 29-Sep-2022):  Refd Jean-Paul 2019 as 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 A blind MoCA=17/22.  c/w frontal-subcort dysfunc w/ deficits in exec func, speed, AD less likely.  Spine MRI (16-Dec-2019): lumbar spondylosis, severe canal stenosis, bilat forami stenois L4/5  Brain MRI (3-Apr-2019): chronic sml vsl ischemic changes, cerebral vol approp to age  PET (7-Oct-2022): Prominent asym hypometabolism in Lt parietal and temporal lobe, a pattern suggestive of Alzheimer's disease in the appropriate clinical setting.  A&P:  Summary 4y memory decline: word-finding diffic then losing things, slowly progressing.  Genl notable for obesity & distal lymphedema.  Neuro exam w/ LE areflexia, Rt limiting gait instability now pending Rt TKR, new recent fall.  Cognitive exam w/o overt word-finding diffic.  Prev MoCA=15/30, GDS=1/15.  Labs unremarkable.  Neuropsych c/w mixed AD>vascular dementia.  Brain MRI (2019) sml vsl dis and volume loss.  PET (2022) showed Lt parietotemporal hypometab c/w AD. Interpret:  Course w/ slowly progressive AD.  Again recc cane or stick, aquacise post Rt TKR.  Plan:  Pending TKR rehab will be challenge of mobilizing through pain, then access aquacise and wt loss.  Meds appro & well-tolerated.   F/U:  I discussed matters with the pt and anju  (very supportive) w/ full engagement in challenges of rehab.  I will RTC in 6 months.  I encouraged interim contact for issues.   Thank you for allowing me to participate in the care of your patient.   Sincerely yours, Parvez Guan M.D., Ph.D.

## 2024-07-25 ENCOUNTER — TELEPHONE (OUTPATIENT)
Dept: PRIMARY CARE | Facility: CLINIC | Age: 74
End: 2024-07-25
Payer: MEDICARE

## 2024-07-25 NOTE — TELEPHONE ENCOUNTER
LATOYA FROM [facility] CALLED WANTING TO KNOW TH STATIS ON SURGICAL CLEARANCE SHE IS SCHEDULED FOR 8/2/24

## 2024-07-26 RX ORDER — BACLOFEN 20 MG
500 TABLET ORAL DAILY
COMMUNITY
Start: 2024-07-19

## 2024-08-06 ENCOUNTER — TELEPHONE (OUTPATIENT)
Dept: PRIMARY CARE | Facility: CLINIC | Age: 74
End: 2024-08-06
Payer: MEDICARE

## 2024-08-06 NOTE — TELEPHONE ENCOUNTER
Daughter Rosanna called and left VM, needs to r/s 8/23/2024 W.  Called Rosanna, r/s to 9/23/2024.

## 2024-08-08 DIAGNOSIS — B35.4 TINEA CORPORIS: Primary | ICD-10-CM

## 2024-08-08 RX ORDER — NYSTATIN 100000 U/G
CREAM TOPICAL 2 TIMES DAILY
Qty: 30 G | Refills: 2 | Status: SHIPPED | OUTPATIENT
Start: 2024-08-08 | End: 2024-08-15

## 2024-08-14 ENCOUNTER — TELEPHONE (OUTPATIENT)
Dept: PRIMARY CARE | Facility: CLINIC | Age: 74
End: 2024-08-14
Payer: MEDICARE

## 2024-08-14 NOTE — TELEPHONE ENCOUNTER
Patient is constipated and they would like an order for her to get a suppository. Please fax to [phone]

## 2024-08-22 ENCOUNTER — APPOINTMENT (OUTPATIENT)
Dept: PRIMARY CARE | Facility: CLINIC | Age: 74
End: 2024-08-22
Payer: MEDICARE

## 2024-08-23 ENCOUNTER — APPOINTMENT (OUTPATIENT)
Dept: PRIMARY CARE | Facility: CLINIC | Age: 74
End: 2024-08-23
Payer: MEDICARE

## 2024-09-04 ENCOUNTER — TELEPHONE (OUTPATIENT)
Dept: PRIMARY CARE | Facility: CLINIC | Age: 74
End: 2024-09-04
Payer: MEDICARE

## 2024-09-05 NOTE — TELEPHONE ENCOUNTER
On call note:  Spoke w/nurse (Kaykay):  Patient w/itching right shoulder down to right ankle.  Mentioned it once today and once last week.  Has red area right upper extremity which nurse is not sure if it's an actual rash vs irritation due to scratching the area.  Nurse did not feel the rash was consistent w/shingles or an infectious etiology.  Advised nurse that patient was seen by PCP 3 months ago for general itching, but no mention of rash at that time.  Low dose Zyrtec or Benadryl recommended at that time.  Nurse states she will contact patient's daughter and advise her to bring in low dose Zyrtec or Benadryl as previously advised by PCP.  Will call back if symptoms worsen or persist.

## 2024-09-23 ENCOUNTER — APPOINTMENT (OUTPATIENT)
Dept: PRIMARY CARE | Facility: CLINIC | Age: 74
End: 2024-09-23
Payer: MEDICARE

## 2024-09-23 VITALS
HEART RATE: 66 BPM | BODY MASS INDEX: 39.08 KG/M2 | RESPIRATION RATE: 18 BRPM | TEMPERATURE: 97.1 F | HEIGHT: 61 IN | WEIGHT: 207 LBS | OXYGEN SATURATION: 94 % | DIASTOLIC BLOOD PRESSURE: 72 MMHG | SYSTOLIC BLOOD PRESSURE: 138 MMHG

## 2024-09-23 DIAGNOSIS — I10 PRIMARY HYPERTENSION: ICD-10-CM

## 2024-09-23 DIAGNOSIS — G47.33 OBSTRUCTIVE SLEEP APNEA SYNDROME: ICD-10-CM

## 2024-09-23 DIAGNOSIS — M54.41 BILATERAL LOW BACK PAIN WITH RIGHT-SIDED SCIATICA, UNSPECIFIED CHRONICITY: ICD-10-CM

## 2024-09-23 DIAGNOSIS — F32.5 MAJOR DEPRESSIVE DISORDER WITH SINGLE EPISODE, IN FULL REMISSION (CMS-HCC): ICD-10-CM

## 2024-09-23 DIAGNOSIS — Z00.00 MEDICARE ANNUAL WELLNESS VISIT, SUBSEQUENT: Primary | ICD-10-CM

## 2024-09-23 DIAGNOSIS — F03.B3 MODERATE DEMENTIA WITH MOOD DISTURBANCE, UNSPECIFIED DEMENTIA TYPE (MULTI): ICD-10-CM

## 2024-09-23 DIAGNOSIS — Z12.31 BREAST CANCER SCREENING BY MAMMOGRAM: ICD-10-CM

## 2024-09-23 DIAGNOSIS — F41.9 ANXIETY: ICD-10-CM

## 2024-09-23 PROCEDURE — 1123F ACP DISCUSS/DSCN MKR DOCD: CPT | Performed by: FAMILY MEDICINE

## 2024-09-23 PROCEDURE — 3078F DIAST BP <80 MM HG: CPT | Performed by: FAMILY MEDICINE

## 2024-09-23 PROCEDURE — G0439 PPPS, SUBSEQ VISIT: HCPCS | Performed by: FAMILY MEDICINE

## 2024-09-23 PROCEDURE — 1158F ADVNC CARE PLAN TLK DOCD: CPT | Performed by: FAMILY MEDICINE

## 2024-09-23 PROCEDURE — 1125F AMNT PAIN NOTED PAIN PRSNT: CPT | Performed by: FAMILY MEDICINE

## 2024-09-23 PROCEDURE — 1157F ADVNC CARE PLAN IN RCRD: CPT | Performed by: FAMILY MEDICINE

## 2024-09-23 PROCEDURE — 1036F TOBACCO NON-USER: CPT | Performed by: FAMILY MEDICINE

## 2024-09-23 PROCEDURE — 3008F BODY MASS INDEX DOCD: CPT | Performed by: FAMILY MEDICINE

## 2024-09-23 PROCEDURE — 1160F RVW MEDS BY RX/DR IN RCRD: CPT | Performed by: FAMILY MEDICINE

## 2024-09-23 PROCEDURE — 99214 OFFICE O/P EST MOD 30 MIN: CPT | Performed by: FAMILY MEDICINE

## 2024-09-23 PROCEDURE — 1159F MED LIST DOCD IN RCRD: CPT | Performed by: FAMILY MEDICINE

## 2024-09-23 PROCEDURE — 3075F SYST BP GE 130 - 139MM HG: CPT | Performed by: FAMILY MEDICINE

## 2024-09-23 ASSESSMENT — PATIENT HEALTH QUESTIONNAIRE - PHQ9
8. MOVING OR SPEAKING SO SLOWLY THAT OTHER PEOPLE COULD HAVE NOTICED. OR THE OPPOSITE, BEING SO FIGETY OR RESTLESS THAT YOU HAVE BEEN MOVING AROUND A LOT MORE THAN USUAL: NOT AT ALL
6. FEELING BAD ABOUT YOURSELF - OR THAT YOU ARE A FAILURE OR HAVE LET YOURSELF OR YOUR FAMILY DOWN: NOT AT ALL
SUM OF ALL RESPONSES TO PHQ9 QUESTIONS 1 AND 2: 0
2. FEELING DOWN, DEPRESSED OR HOPELESS: NOT AT ALL
7. TROUBLE CONCENTRATING ON THINGS, SUCH AS READING THE NEWSPAPER OR WATCHING TELEVISION: NOT AT ALL
5. POOR APPETITE OR OVEREATING: NOT AT ALL
4. FEELING TIRED OR HAVING LITTLE ENERGY: SEVERAL DAYS
10. IF YOU CHECKED OFF ANY PROBLEMS, HOW DIFFICULT HAVE THESE PROBLEMS MADE IT FOR YOU TO DO YOUR WORK, TAKE CARE OF THINGS AT HOME, OR GET ALONG WITH OTHER PEOPLE: NOT DIFFICULT AT ALL
1. LITTLE INTEREST OR PLEASURE IN DOING THINGS: NOT AT ALL
3. TROUBLE FALLING OR STAYING ASLEEP OR SLEEPING TOO MUCH: NOT AT ALL
SUM OF ALL RESPONSES TO PHQ QUESTIONS 1-9: 1
9. THOUGHTS THAT YOU WOULD BE BETTER OFF DEAD, OR OF HURTING YOURSELF: NOT AT ALL

## 2024-09-23 ASSESSMENT — ENCOUNTER SYMPTOMS
DEPRESSION: 0
LOSS OF SENSATION IN FEET: 0
OCCASIONAL FEELINGS OF UNSTEADINESS: 0

## 2024-09-23 ASSESSMENT — ANXIETY QUESTIONNAIRES
IF YOU CHECKED OFF ANY PROBLEMS ON THIS QUESTIONNAIRE, HOW DIFFICULT HAVE THESE PROBLEMS MADE IT FOR YOU TO DO YOUR WORK, TAKE CARE OF THINGS AT HOME, OR GET ALONG WITH OTHER PEOPLE: NOT DIFFICULT AT ALL
GAD7 TOTAL SCORE: 0
3. WORRYING TOO MUCH ABOUT DIFFERENT THINGS: NOT AT ALL
5. BEING SO RESTLESS THAT IT IS HARD TO SIT STILL: NOT AT ALL
7. FEELING AFRAID AS IF SOMETHING AWFUL MIGHT HAPPEN: NOT AT ALL
1. FEELING NERVOUS, ANXIOUS, OR ON EDGE: NOT AT ALL
2. NOT BEING ABLE TO STOP OR CONTROL WORRYING: NOT AT ALL
6. BECOMING EASILY ANNOYED OR IRRITABLE: NOT AT ALL
4. TROUBLE RELAXING: NOT AT ALL

## 2024-09-23 ASSESSMENT — PAIN SCALES - GENERAL: PAINLEVEL: 2

## 2024-09-23 NOTE — PROGRESS NOTES
Subjective   Reason for Visit: Tabby Morrell is an 73 y.o. female here for a Medicare Wellness visit.     Past Medical, Surgical, and Family History reviewed and updated in chart.    Reviewed all medications by prescribing practitioner or clinical pharmacist (such as prescriptions, OTCs, herbal therapies and supplements) and documented in the medical record.    HPI  The patient presents to the clinic for an annual medicare wellness visit. She has past medical history of hypertension, peripheral vascular diseases, mild vitamin D deficiency, chronic diarreha, calcium kidney stones, urinary incontinence, anxiety, depression, arthritis of knee, low back pain, daytime somnolence, moderate dementia, lumbar canal stenosis, mild cognitive impairment, memory loss, neuropathy, and SHANTEL.    She is accompanied by a family member in the clinic today.  Pts daughter    The patient recalls that she had right knee surgery in early August 2024. She has recovered well following from this procedure. She may experience intermittent swelling in the right leg. The patient had been suffering from severe intermittent low back pain (sciatica) that diffuses into the right leg. She attempted to control this pain with use of OTC acetaminophen (moderate relief). She recalls more pain in sitting postion than in standing position. She also suffered from ~1-2 vomiting episodes during that time. Notably, the patient had an x-ray of the lumbar spine in June 2024 which revealed moderate-severe multilevel spondylosis (worse at L2-L3) and severe facet disease in the lower lumbar spine.  Discussed pain mgmt, no longer in pain since started PT and will observe for now    Her most recent labs were done in June 2024. Her labs were mostly normal at the time.    She is on desvenlafaxine 100 mg daily for treatment of depression/anxiety. Her mood has been good on this medication. Her depression/anxiety have been controlled. She denies any side-effects to her  "desvenlafaxine medication.  Some increased confusion at times per staff but overall stable    She takes Aricept 10 mg nightly and Namenda 10 mg (2 times daily) for treatment of dementia. She denies any side-effects to her Aricept and/or Namenda medications.  She continues following with neurology for this    She lives in an assisted living facility. Facility providers have been refilling her medication.     She is off all pain medicine from her surgery    She had flu vaccine and COVID-vaccine August 29, 2024    She has been working on weight loss, trying to be more active and decreasing sweets at her meals.    Her most recent mammogram screening and DEXA bone density scan were done in October 2023. Her most recent colonoscopy screening was done in April 2023 (5-year recall).    Patient Care Team:  Nidhi Severino DO as PCP - General  Nidhi Severino DO [facility] PCP [facility]     Review of Systems    Objective   Vitals:  /72   Pulse 66   Temp 36.2 °C (97.1 °F)   Resp 18   Ht 1.549 m (5' 1\")   Wt 93.9 kg (207 lb)   SpO2 94%   BMI 39.11 kg/m²       Physical Exam  Neck:      Vascular: No carotid bruit.   Cardiovascular:      Rate and Rhythm: Normal rate and regular rhythm.      Pulses: Normal pulses.      Heart sounds: Normal heart sounds.   Pulmonary:      Effort: Pulmonary effort is normal.      Breath sounds: Normal breath sounds.   Musculoskeletal:         General: Normal range of motion.      Cervical back: Normal range of motion.      Right lower leg: No edema.      Left lower leg: No edema.      Comments: Scar R knee healing well   Skin:     General: Skin is warm and dry.   Neurological:      Mental Status: She is alert.      Comments: Oriented x 2   Psychiatric:         Mood and Affect: Mood normal.         Thought Content: Thought content normal.         Judgment: Judgment normal.         Assessment & Plan  Medicare annual wellness visit, subsequent     "     Primary hypertension         Major depressive disorder with single episode, in full remission (CMS-HCC)         Anxiety         Bilateral low back pain with right-sided sciatica, unspecified chronicity         Moderate dementia with mood disturbance, unspecified dementia type (Multi)         Obstructive sleep apnea syndrome         Breast cancer screening by mammogram    Orders:    BI mammo bilateral screening tomosynthesis; Future                The patient does not require repeat labs at this time.     A repeat bilateral mammmogram screening was ordered for the patient to complete in October 2024. The clinic will contact the patient upon receiving her lab results.    She does not require any medication refills at this time (filled by facility providers).  No changes in medication.    In regards to concerns with severe intermittent back pain (diffusing into the right leg), the patient was advised that her symptoms may improve with physical therapy sessions. She was instructed to continue taking OTC acetaminophen PRN if it is able to control her symptoms. Continue monitoring symptoms for improvement/exacerbation.  She states pain was very severe but she has not had pain for at least a week to 10 days, will continue with physical therapy.    Prospective immunizations were discussed with the patient. She received her annual flu vaccine and COVID-19 booster vaccine in late August 2024.     Discussed fall risk.  She is using walker whenever she goes out or walks long distance and is doing well with this.  She is working on leg strengthening with physical therapy as well as physical therapy for her knee surgery.    She will follow-up in 6 months, unless otherwise needed.    Scribe Attestation  By signing my name below, IJonelle Scribe   attest that this documentation has been prepared under the direction and in the presence of Nidhi Severino DO.

## 2024-10-14 NOTE — PROGRESS NOTES
Patient is sent at the request of No ref. provider found for my opinion regarding Type 2 diabetes.  My final recommendations will be communicated back to the requesting provider by way of shared medical record.    Dalton Vaca is a 73 y.o. female who presents for initial visit for evaluation of Type 2 diabetes mellitus. The initial diagnosis of diabetes was made in 2022 .  At that time she was losing weight and had rapid developing cataracts    PCP recommended Januvia, Farxiga and Ozempic but these were all costly.      She has retinopathy.  She receives injections bilaterally every 3 months    Known complications due to diabetes included retinopathy and obesity    Cardiovascular risk factors include advanced age (older than 55 for men, 65 for women), diabetes mellitus, microalbuminuria, and obesity (BMI >= 30 kg/m2). The patient is on an ACE inhibitor or angiotensin II receptor blocker.  The patient has not been previously hospitalized due to diabetic ketoacidosis.     Current symptoms/problems include none. Her clinical course has been stable.     Current diabetes regimen is as follows:   Metformin 1000mg twice daily   Glimepiride 4mg once daily     The patient is currently checking the blood glucose.      Patient is using: glucometer  Typically under 140mg/dL     Hypoglycemia frequency: Symptoms at 100mg/dL   Hypoglycemia awareness: Yes     Exercise: never     MEALS:  Breakfast - 2 oz steak, roll, plum, peanut buter and jelly   Lunch - can occur closer to lunch time   Dinner - Outback steak ouse - steak, mashed potaotes, salad, ribs  Snacks - sweet potato chips   Beverages - water with lemon, sugar free Vitamin water       Review of Systems   Constitutional:  Positive for fatigue and unexpected weight change.   HENT:  Positive for trouble swallowing.    Eyes:  Positive for visual disturbance.   Respiratory:  Positive for cough.    Gastrointestinal:  Positive for constipation and diarrhea.  "  Genitourinary:         Nocturia x 3-4  Urinary incontinence    Musculoskeletal:  Positive for arthralgias and myalgias.   Neurological:  Positive for numbness.   Psychiatric/Behavioral:          Depressed   All other systems reviewed and are negative.      Objective   /78 (BP Location: Left arm, Patient Position: Sitting, BP Cuff Size: Large adult)   Pulse 85   Ht 1.626 m (5' 4\")   Wt 114 kg (250 lb 9.6 oz)   BMI 43.02 kg/m²   Physical Exam  Vitals and nursing note reviewed.   Constitutional:       General: She is not in acute distress.     Appearance: Normal appearance. She is normal weight.   HENT:      Head: Normocephalic and atraumatic.      Nose: Nose normal.      Mouth/Throat:      Mouth: Mucous membranes are moist.   Eyes:      Extraocular Movements: Extraocular movements intact.   Cardiovascular:      Rate and Rhythm: Normal rate and regular rhythm.   Pulmonary:      Effort: Pulmonary effort is normal.      Breath sounds: Normal breath sounds.   Musculoskeletal:         General: Normal range of motion.      Right foot: Normal range of motion. No deformity, bunion or Charcot foot.      Left foot: Normal range of motion. No deformity, bunion or Charcot foot.   Feet:      Right foot:      Skin integrity: Skin integrity normal. No ulcer, blister, skin breakdown or erythema.      Toenail Condition: Right toenails are abnormally thick. Fungal disease present.     Left foot:      Skin integrity: Skin integrity normal. No ulcer, blister, skin breakdown or erythema.      Toenail Condition: Left toenails are abnormally thick. Fungal disease present.  Skin:     General: Skin is warm.   Neurological:      Mental Status: She is alert and oriented to person, place, and time.   Psychiatric:         Mood and Affect: Mood normal.       Lab Review  A1C was 6.7% at the beginning of the year     Health Maintenance:   Foot Exam:   Eye Exam:  October 5, 2024  Lipid Panel:  Urine Albumin:    Assessment/Plan   73 year " old female presents for the evaluation and further management of type II  diabetes mellitus.  Her blood pressure is at goal today.      Type 2 diabetes mellitus without complication (Multi)  To continue Glimepiride 4mg once daily   To continue Metformin 1000mg twice daily   To obtain fasting blood and urine tests  Please check blood sugars 1-2 times daily and keep a detailed log  For eye examinations as scheduled  Please keep feet moisturized and inspect daily  To follow up in 6 months

## 2024-10-14 NOTE — PATIENT INSTRUCTIONS
Thank you for choosing [facility]  for your health care needs.  If you have any questions, concerns or medical needs, please feel free to contact our office at [phone].    Please ensure you complete your blood work one week before the next scheduled appointment.    To continue Glimepiride 4mg once daily   To continue Metformin 1000mg twice daily   To obtain fasting blood and urine tests  Please check blood sugars 1-2 times daily and keep a detailed log  For eye examinations as scheduled  Please keep feet moisturized and inspect daily  To follow up in 6 months

## 2024-10-15 ENCOUNTER — APPOINTMENT (OUTPATIENT)
Dept: ENDOCRINOLOGY | Facility: CLINIC | Age: 74
End: 2024-10-15

## 2024-10-15 VITALS
SYSTOLIC BLOOD PRESSURE: 126 MMHG | WEIGHT: 250.6 LBS | HEART RATE: 85 BPM | BODY MASS INDEX: 42.78 KG/M2 | HEIGHT: 64 IN | DIASTOLIC BLOOD PRESSURE: 78 MMHG

## 2024-10-15 DIAGNOSIS — E11.9 TYPE 2 DIABETES MELLITUS WITHOUT COMPLICATION, UNSPECIFIED WHETHER LONG TERM INSULIN USE (MULTI): Primary | ICD-10-CM

## 2024-10-15 PROCEDURE — 3008F BODY MASS INDEX DOCD: CPT | Performed by: INTERNAL MEDICINE

## 2024-10-15 PROCEDURE — 4010F ACE/ARB THERAPY RXD/TAKEN: CPT | Performed by: INTERNAL MEDICINE

## 2024-10-15 PROCEDURE — 3078F DIAST BP <80 MM HG: CPT | Performed by: INTERNAL MEDICINE

## 2024-10-15 PROCEDURE — 1160F RVW MEDS BY RX/DR IN RCRD: CPT | Performed by: INTERNAL MEDICINE

## 2024-10-15 PROCEDURE — G2211 COMPLEX E/M VISIT ADD ON: HCPCS | Performed by: INTERNAL MEDICINE

## 2024-10-15 PROCEDURE — 99204 OFFICE O/P NEW MOD 45 MIN: CPT | Performed by: INTERNAL MEDICINE

## 2024-10-15 PROCEDURE — 3074F SYST BP LT 130 MM HG: CPT | Performed by: INTERNAL MEDICINE

## 2024-10-15 PROCEDURE — 1159F MED LIST DOCD IN RCRD: CPT | Performed by: INTERNAL MEDICINE

## 2024-10-15 RX ORDER — METFORMIN HYDROCHLORIDE 1000 MG/1
1000 TABLET ORAL
COMMUNITY
Start: 2024-10-09 | End: 2024-10-15 | Stop reason: SDUPTHER

## 2024-10-15 RX ORDER — LATANOPROST 50 UG/ML
1 SOLUTION/ DROPS OPHTHALMIC NIGHTLY
COMMUNITY
Start: 2024-10-09

## 2024-10-15 RX ORDER — AMLODIPINE BESYLATE 10 MG/1
1 TABLET ORAL
COMMUNITY
Start: 2024-10-09

## 2024-10-15 RX ORDER — METHOCARBAMOL 750 MG/1
750 TABLET, FILM COATED ORAL 3 TIMES DAILY
COMMUNITY
Start: 2024-01-29

## 2024-10-15 RX ORDER — METFORMIN HYDROCHLORIDE 1000 MG/1
1000 TABLET ORAL
Qty: 180 TABLET | Refills: 1 | Status: SHIPPED | OUTPATIENT
Start: 2024-10-15 | End: 2025-04-13

## 2024-10-15 RX ORDER — GLIMEPIRIDE 4 MG/1
4 TABLET ORAL
Qty: 90 TABLET | Refills: 1 | Status: SHIPPED | OUTPATIENT
Start: 2024-10-15 | End: 2024-10-15

## 2024-10-15 RX ORDER — GLIMEPIRIDE 4 MG/1
4 TABLET ORAL
Qty: 90 TABLET | Refills: 1 | Status: SHIPPED | OUTPATIENT
Start: 2024-10-15 | End: 2025-04-13

## 2024-10-15 RX ORDER — GLIMEPIRIDE 4 MG/1
1 TABLET ORAL
COMMUNITY
Start: 2024-10-09 | End: 2024-10-15 | Stop reason: SDUPTHER

## 2024-10-15 RX ORDER — ALBUTEROL SULFATE 90 UG/1
2 AEROSOL, METERED RESPIRATORY (INHALATION) EVERY 6 HOURS PRN
COMMUNITY
Start: 2024-01-29

## 2024-10-15 RX ORDER — LOSARTAN POTASSIUM 100 MG/1
1 TABLET ORAL
COMMUNITY
Start: 2024-10-09

## 2024-10-15 ASSESSMENT — ENCOUNTER SYMPTOMS
ARTHRALGIAS: 1
NUMBNESS: 1

## 2024-10-17 PROBLEM — E11.9 TYPE 2 DIABETES MELLITUS WITHOUT COMPLICATION (MULTI): Status: ACTIVE | Noted: 2024-10-17

## 2024-10-17 PROBLEM — E11.9 TYPE 2 DIABETES MELLITUS WITHOUT COMPLICATION: Status: ACTIVE | Noted: 2024-10-17

## 2024-10-17 ASSESSMENT — ENCOUNTER SYMPTOMS
TROUBLE SWALLOWING: 1
DIARRHEA: 1
CONSTIPATION: 1
UNEXPECTED WEIGHT CHANGE: 1
MYALGIAS: 1
COUGH: 1
FATIGUE: 1

## 2024-10-17 NOTE — ASSESSMENT & PLAN NOTE
To continue Glimepiride 4mg once daily   To continue Metformin 1000mg twice daily   To obtain fasting blood and urine tests  Please check blood sugars 1-2 times daily and keep a detailed log  For eye examinations as scheduled  Please keep feet moisturized and inspect daily  To follow up in 6 months

## 2024-11-27 ENCOUNTER — TELEPHONE (OUTPATIENT)
Dept: PRIMARY CARE | Facility: CLINIC | Age: 74
End: 2024-11-27
Payer: MEDICARE

## 2024-11-27 ENCOUNTER — HOSPITAL ENCOUNTER (OUTPATIENT)
Dept: RADIOLOGY | Facility: CLINIC | Age: 74
Discharge: HOME | End: 2024-11-27
Payer: MEDICARE

## 2024-11-27 VITALS — BODY MASS INDEX: 36.82 KG/M2 | WEIGHT: 195 LBS | HEIGHT: 61 IN

## 2024-11-27 DIAGNOSIS — Z12.31 BREAST CANCER SCREENING BY MAMMOGRAM: ICD-10-CM

## 2024-11-27 PROCEDURE — 77067 SCR MAMMO BI INCL CAD: CPT

## 2024-12-13 ENCOUNTER — TELEPHONE (OUTPATIENT)
Dept: PRIMARY CARE | Facility: CLINIC | Age: 74
End: 2024-12-13
Payer: MEDICARE

## 2024-12-13 NOTE — TELEPHONE ENCOUNTER
Funmi from [facility] called looking for advice on patients sciatica pain. Patient was under Medrol dose pack and it was effective,  after treatment conclude patient started to complain and crying. She was wondering what she can do. Direct phone number [phone].

## 2025-01-13 ENCOUNTER — OFFICE VISIT (OUTPATIENT)
Dept: NEUROLOGY | Facility: CLINIC | Age: 75
End: 2025-01-13
Payer: MEDICARE

## 2025-01-13 VITALS
SYSTOLIC BLOOD PRESSURE: 119 MMHG | TEMPERATURE: 97.3 F | HEART RATE: 67 BPM | DIASTOLIC BLOOD PRESSURE: 54 MMHG | OXYGEN SATURATION: 100 % | HEIGHT: 61 IN | BODY MASS INDEX: 37.76 KG/M2 | RESPIRATION RATE: 16 BRPM | WEIGHT: 200 LBS

## 2025-01-13 DIAGNOSIS — F02.818 LATE ONSET ALZHEIMER'S DISEASE WITH BEHAVIORAL DISTURBANCE (MULTI): Primary | ICD-10-CM

## 2025-01-13 DIAGNOSIS — G30.1 LATE ONSET ALZHEIMER'S DISEASE WITH BEHAVIORAL DISTURBANCE (MULTI): Primary | ICD-10-CM

## 2025-01-13 PROCEDURE — 99215 OFFICE O/P EST HI 40 MIN: CPT | Performed by: PSYCHIATRY & NEUROLOGY

## 2025-01-13 PROCEDURE — 3074F SYST BP LT 130 MM HG: CPT | Performed by: PSYCHIATRY & NEUROLOGY

## 2025-01-13 PROCEDURE — 1157F ADVNC CARE PLAN IN RCRD: CPT | Performed by: PSYCHIATRY & NEUROLOGY

## 2025-01-13 PROCEDURE — 3008F BODY MASS INDEX DOCD: CPT | Performed by: PSYCHIATRY & NEUROLOGY

## 2025-01-13 PROCEDURE — 1126F AMNT PAIN NOTED NONE PRSNT: CPT | Performed by: PSYCHIATRY & NEUROLOGY

## 2025-01-13 PROCEDURE — 1123F ACP DISCUSS/DSCN MKR DOCD: CPT | Performed by: PSYCHIATRY & NEUROLOGY

## 2025-01-13 PROCEDURE — 1159F MED LIST DOCD IN RCRD: CPT | Performed by: PSYCHIATRY & NEUROLOGY

## 2025-01-13 PROCEDURE — 3078F DIAST BP <80 MM HG: CPT | Performed by: PSYCHIATRY & NEUROLOGY

## 2025-01-13 PROCEDURE — 1036F TOBACCO NON-USER: CPT | Performed by: PSYCHIATRY & NEUROLOGY

## 2025-01-13 RX ORDER — RISPERIDONE 0.5 MG/1
0.5 TABLET ORAL DAILY
Qty: 30 TABLET | Refills: 3 | Status: SHIPPED | OUTPATIENT
Start: 2025-01-13 | End: 2025-05-13

## 2025-01-13 SDOH — ECONOMIC STABILITY: FOOD INSECURITY: WITHIN THE PAST 12 MONTHS, YOU WORRIED THAT YOUR FOOD WOULD RUN OUT BEFORE YOU GOT MONEY TO BUY MORE.: NEVER TRUE

## 2025-01-13 SDOH — ECONOMIC STABILITY: FOOD INSECURITY: WITHIN THE PAST 12 MONTHS, THE FOOD YOU BOUGHT JUST DIDN'T LAST AND YOU DIDN'T HAVE MONEY TO GET MORE.: NEVER TRUE

## 2025-01-13 ASSESSMENT — ENCOUNTER SYMPTOMS
OCCASIONAL FEELINGS OF UNSTEADINESS: 0
LOSS OF SENSATION IN FEET: 0
DEPRESSION: 0

## 2025-01-13 ASSESSMENT — LIFESTYLE VARIABLES: HOW MANY STANDARD DRINKS CONTAINING ALCOHOL DO YOU HAVE ON A TYPICAL DAY: PATIENT DOES NOT DRINK

## 2025-01-13 ASSESSMENT — PAIN SCALES - GENERAL: PAINLEVEL_OUTOF10: 0-NO PAIN

## 2025-01-13 NOTE — PROGRESS NOTES
[place], [place]    Department of Neurology  [facility]     FU Consultation    PCP: Dr. Nidhi Betancourt       2025  Re: Tabby Morrell, DO    : 1950  MRN [id number]    CC: 73yo woman w/ mixed dementia. Info per pt, anju (Alejandrina [place] here 1 wk/ month), EMR.  HPI: : Pt was referred for Neuropsych eval due to memory issues and her Dad's having had AD.  : Pt struggles to recall first symptoms, now struggles to recall first symptoms but it seems word-finding difficulty predominated.  :  Pt and anju report her spirits have improved but still with word-finding struggles and misplacing or finding things she planned to do.  : Recent driving eval led to being told to stop driving, but she persists. : She has PT at her [facility] w/ ST/PT/OT; her dog Omar.   Trouble with all of their names,  STM worse.  :  Pt sts things are really good; no OOB to BR, rare naps.  She plays/care with dog.   Has some scratches on Rt arm, a little Lt arm bruise after fall in showed in [place] (chair and bars in showed).  Upset if people talk.  Med Hx   Allergies: ASAà?, NSAIDsà?, shellfishàN/V,    Ni, benxoin,latexàrash,   Rx:  [id number], lipubpifl82, [id number], CPAP (she stopped using b/o frustration).  H/O: HTN, depression, anxiety, SHANTEL, urinary urgency   arthritis, LBP/lumbar stenosis, Rt sciatica, osteoporosis, +PPD  S/P: cholecystx, hysterx, rib fxs from trauma,  Bilat knee replacement (still Rt post-op pain)  FHx: Dad AD, HTN;   Mom CLL, HTN  PSHx: mendez Velez, graduate [employer]; single in [facility]; uses CPAP, exercises, stopped driving   ROS: Genl: Skin-Skel Litchy rash foot pain       Cardio-pulm nl      U/L-GI nl     Neuro:  Chronic LBP, neck pain, HAs;  w/o CVA, TIA, TMB, VBI   Exam: GY=134/64, HR=67, RR=16, aq=283  General:   lungs w/ clear w/ good air mvmt,   RRR w/o MRG,  full carotids w/o T/B        Abdo soft +BS no bruit;   w/  mild dependent lymphedema   Neuro   MS:  calm w/ wnl affect range;  no hallucins, delus/agitation   CN:  FEOM w/o nystagmus or ptosis Face symm S&E    Motor: strong & steady w/ wnl tone symm  w/o invol mvmts   (new fisting thumb on Lt)   MSRs: subtle-trace UEs, Rt KJ trace, others absent w/o spread   Coordination: FT fast w/ reg pace bilat  FNF accurate    Gait: slow & mildly unsteady, short stride,  no armswing  3 step turn, did not tandem   Cognitive:  RH;  Language  recept: answers & follows;  express:  full sents w/o paraphasias  No misnaming or evident word finding diffic    Praxis:  pen twirling fast & symm (lt took practice)  w/o synkinesia  Prev MoCA HS+1=15/30  c/w mod impair     GDS: 1/15  not suggest mood disorder  Labs (to 8-4-2024):  wbc=4.4,  hct=40.1,  uxc=661   muw=840, bun/crt=20/058, GFR>90,  AST=31  ALT=29, AlkP=87  TSH=2.96     gvwm=326, hdl=77.1, ldl=99, chol/hdl=2.5, VLDL=77, TG=86   F94=8541,  fol>24, D3=28  Neuropsych ([place], 29-Sep-2022):  Refd Ogrocki 2019 as 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 A blind MoCA=17/22.  c/w frontal-subcort dysfunc w/ deficits in exec func, speed, AD less likely.  Spine MRI (16-Dec-2019): lumbar spondylosis, severe canal stenosis, bilat forami stenois L4/5  Brain MRI (3-Apr-2019): chronic sml vsl ischemic changes, cerebral vol approp to age  PET (7-Oct-2022): Prominent asym hypometabolism in Lt parietal and temporal lobe, a pattern suggestive of Alzheimer's disease in the appropriate clinical setting.  Head CT (2-Apr-2024): 1. No acute intracranial abnl.  2.  No cervical fracture or traumatic sublux.   A&P:  Summary 5y memory decline: word-finding diffic then losing things, continues to progress with clear difficulty in conversational memory.  Genl notable for obesity & distal lymphedema.  Neuro exam w/ LE areflexia, bilat TKRs still limit mobility, she has a walker but does not use it much.   Cognitive exam w/ clear memory limits for recent, including daily,  events/activities.  Worsening word-finding diffic has emerged.  Prev MoCA=15/30, GDS=1/15.  Labs unremarkable.  Neuropsych c/w mixed AD>vascular dementia.  Brain MRI (2019) sml vsl dis and volume loss.  PET (2022) showed Lt parietotemporal hypometab c/w AD. Interpret:  Course w/ slowly progressive AD.  Again recc cane or stick, aquacise post Rt TKR.  Plan: .  Meds appro & well-tolerated.   F/U:  I discussed matters with the pt and anju (very supportive) w/ full engagement in challenges of rehab.  I will RTC in 6 months.     Thank you for allowing me to participate in the care of your patient.   Sincerely yours, Parvez Guan M.D., Ph.D.

## 2025-01-28 ENCOUNTER — HOSPITAL ENCOUNTER (OUTPATIENT)
Age: 75
Discharge: HOME | End: 2025-01-30
Payer: MEDICARE

## 2025-01-28 ENCOUNTER — HOSPITAL ENCOUNTER (OUTPATIENT)
Dept: GENERAL RADIOLOGY | Age: 75
Discharge: HOME | End: 2025-01-30
Payer: MEDICARE

## 2025-01-28 DIAGNOSIS — M54.02: ICD-10-CM

## 2025-01-28 PROCEDURE — 72050 X-RAY EXAM NECK SPINE 4/5VWS: CPT

## 2025-03-11 ENCOUNTER — PATIENT OUTREACH (OUTPATIENT)
Dept: PRIMARY CARE | Facility: CLINIC | Age: 75
End: 2025-03-11
Payer: MEDICARE

## 2025-03-11 NOTE — PROGRESS NOTES
Discharge Facility: [facility]  Discharge Diagnosis: chest pain in adult  Admission Date: 3/10/25  Discharge Date: 3/10/25    PCP Appointment Date: no appointments, message to office  Specialist Appointment Date: n/a  Hospital Encounter and Summary Linked: Yes  Hospital Encounter   See discharge assessment below for further details    Wrap Up  Wrap Up Additional Comments: Patient with history of Alzheimers, returned home to her assisted living. Spoke with her daughter Rosanna. She reports patient is doing well since discharge, no further complaints of chest pain. She states her work up was negative at the hospital, no changes to medications. Encouraged follow up appointments as recommended. Rosanna declines any further questions or concerns. (3/11/2025  2:01 PM)    Medications  Medications reviewed with patient/caregiver?: Yes (3/11/2025  2:01 PM)  Is the patient having any side effects they believe may be caused by any medication additions or changes?: No (3/11/2025  2:01 PM)  Does the patient have all medications ordered at discharge?: Yes (3/11/2025  2:01 PM)  Prescription Comments: no new/changed medications (3/11/2025  2:01 PM)  Medication Comments: denies questions or concerns (3/11/2025  2:01 PM)    Appointments  Does the patient have a primary care provider?: Yes (3/11/2025  2:01 PM)  Care Management Interventions: Advised patient to make appointment (3/11/2025  2:01 PM)    Self Management  What is the home health agency?: n/a (3/11/2025  2:01 PM)  What Durable Medical Equipment (DME) was ordered?: n/a (3/11/2025  2:01 PM)    Patient Teaching  Does the patient have access to their discharge instructions?: Yes (3/11/2025  2:01 PM)  Care Management Interventions: Reviewed instructions with patient (3/11/2025  2:01 PM)  What is the patient's perception of their health status since discharge?: Improving (3/11/2025  2:01 PM)

## 2025-03-19 ENCOUNTER — APPOINTMENT (OUTPATIENT)
Dept: PRIMARY CARE | Facility: CLINIC | Age: 75
End: 2025-03-19
Payer: MEDICARE

## 2025-03-19 ENCOUNTER — HOSPITAL ENCOUNTER (OUTPATIENT)
Dept: RADIOLOGY | Facility: CLINIC | Age: 75
Discharge: HOME | End: 2025-03-19
Payer: MEDICARE

## 2025-03-19 VITALS
DIASTOLIC BLOOD PRESSURE: 66 MMHG | WEIGHT: 201 LBS | SYSTOLIC BLOOD PRESSURE: 124 MMHG | BODY MASS INDEX: 37.95 KG/M2 | OXYGEN SATURATION: 98 % | HEART RATE: 60 BPM | RESPIRATION RATE: 12 BRPM | HEIGHT: 61 IN

## 2025-03-19 DIAGNOSIS — Z87.898 HISTORY OF CHEST PAIN: ICD-10-CM

## 2025-03-19 DIAGNOSIS — M85.80 OSTEOPENIA, UNSPECIFIED LOCATION: ICD-10-CM

## 2025-03-19 DIAGNOSIS — Z09 HOSPITAL DISCHARGE FOLLOW-UP: Primary | ICD-10-CM

## 2025-03-19 DIAGNOSIS — R32 URINARY INCONTINENCE, UNSPECIFIED TYPE: ICD-10-CM

## 2025-03-19 DIAGNOSIS — Z91.89 10 YEAR RISK OF MI OR STROKE 7.5% OR GREATER: ICD-10-CM

## 2025-03-19 PROCEDURE — 3078F DIAST BP <80 MM HG: CPT | Performed by: FAMILY MEDICINE

## 2025-03-19 PROCEDURE — 99495 TRANSJ CARE MGMT MOD F2F 14D: CPT | Performed by: FAMILY MEDICINE

## 2025-03-19 PROCEDURE — 1036F TOBACCO NON-USER: CPT | Performed by: FAMILY MEDICINE

## 2025-03-19 PROCEDURE — 75571 CT HRT W/O DYE W/CA TEST: CPT

## 2025-03-19 PROCEDURE — 3074F SYST BP LT 130 MM HG: CPT | Performed by: FAMILY MEDICINE

## 2025-03-19 PROCEDURE — 1123F ACP DISCUSS/DSCN MKR DOCD: CPT | Performed by: FAMILY MEDICINE

## 2025-03-19 PROCEDURE — 1157F ADVNC CARE PLAN IN RCRD: CPT | Performed by: FAMILY MEDICINE

## 2025-03-19 PROCEDURE — 1159F MED LIST DOCD IN RCRD: CPT | Performed by: FAMILY MEDICINE

## 2025-03-19 PROCEDURE — 1160F RVW MEDS BY RX/DR IN RCRD: CPT | Performed by: FAMILY MEDICINE

## 2025-03-19 PROCEDURE — 3008F BODY MASS INDEX DOCD: CPT | Performed by: FAMILY MEDICINE

## 2025-03-19 NOTE — PATIENT INSTRUCTIONS
Chest pain resolved.    Discussed 10 yr risk CVA/MI (11.1%).  Coronary calcium score ordered.  Patient declines statin despite elevated risk CVA/MI.    Printed copy of last DEXA for patient to take to spine specialist.    Urinalysis ordered (patient unable to provide enough urine to test in the office).    F/U w/PCP.     Lab services: [address]  Hours: M-F 7:15a-6:00p  Phone: [phone], Option 1

## 2025-03-19 NOTE — PROGRESS NOTES
"Subjective   Patient ID: Tabby Morrell is a 74 y.o. female who presents for Hospital Follow-up.    [facility]   PCP: Dr. Corona    Hospitalized 3/9/25 to 3/10/25 for chest pain.  Treatments: Supportive.  Procedures: Nuclear stress test (normal perfusion, no inducible ischemia, EF 67%).  Medication changes: N/A.  Advised to follow up with: PCP.  Outpatient recommendations: N/A.  Since hospital discharge: No chest pain, no cardiac complaints.    Note:   10 yr risk CVA/MI 11.1%.  Not on statin, no coronary calcium score in the system.  Wants coronary calcium score.  Declines statin.    Discharge summary was reviewed.  Transitional Care Management documentation was reviewed.   Medication reconciliation was performed as indicated via the \"[place] as Reviewed\" timestamp.   The complexity of medical decision making for this patient's transitional care is moderate.     Review of Systems  States she saw ortho yesterday ([facility], records not available), states she was advised to consider DEXA if not up to date.  Last DEXA 10/10/23 (osteopenia).    Had 2 episodes of urinary incontinence, requests u/a to make sure she doesn't have UTI (unable to provide enough urine to test at time of office visit).    Objective   /66   Pulse 60   Resp 12   Ht 1.549 m (5' 1\")   Wt 91.2 kg (201 lb)   SpO2 98%   BMI 37.98 kg/m²     Physical Exam  Constitutional:       General: She is not in acute distress.     Appearance: She is obese.   Cardiovascular:      Rate and Rhythm: Normal rate and regular rhythm.      Heart sounds: Normal heart sounds. No murmur heard.     No friction rub. No gallop.   Pulmonary:      Effort: Pulmonary effort is normal.      Breath sounds: Normal breath sounds. No wheezing, rhonchi or rales.   Neurological:      Mental Status: She is oriented to person, place, and time.   Psychiatric:         Mood and Affect: Mood normal.         Behavior: Behavior normal.     Assessment/Plan   Diagnoses and all orders for this " visit:  Hospital discharge follow-up  History of chest pain  10 year risk of MI or stroke 7.5% or greater  -     CT cardiac scoring wo IV contrast; Future  Urinary incontinence, unspecified type  -     Urinalysis with Reflex Culture and Microscopic; Future  Osteopenia, unspecified location    Chest pain resolved.    Discussed 10 yr risk CVA/MI (11.1%).  Coronary calcium score ordered.  Patient declines statin despite elevated risk CVA/MI.    Printed copy of last DEXA for patient to take to spine specialist.    Urinalysis ordered (patient unable to provide enough urine to test in the office).    F/U w/PCP.

## 2025-03-19 NOTE — PROGRESS NOTES
"Subjective   Patient ID: Tabby Morrell is a 74 y.o. female who presents for Hospital Follow-up.    HPI     Review of Systems    Objective   /66   Pulse 60   Resp 12   Ht 1.549 m (5' 1\")   Wt 91.2 kg (201 lb)   SpO2 98%   BMI 37.98 kg/m²     Physical Exam    Assessment/Plan          "

## 2025-03-24 ENCOUNTER — APPOINTMENT (OUTPATIENT)
Dept: PRIMARY CARE | Facility: CLINIC | Age: 75
End: 2025-03-24
Payer: MEDICARE

## 2025-03-26 ENCOUNTER — PATIENT OUTREACH (OUTPATIENT)
Dept: PRIMARY CARE | Facility: CLINIC | Age: 75
End: 2025-03-26
Payer: MEDICARE

## 2025-03-26 NOTE — PROGRESS NOTES
Call regarding appt. with PCP on (3/19/25) after hospitalization.  At time of outreach call the patient/daughter feels as if their condition has (improved) since last visit.  Reviewed the PCP appointment and addressed any questions or concerns.    Spoke with patient's daughter Rosanna who states patient is doing okay, no further complaints of pain since discharge. She states PCP appt went well. She denies any questions or concerns.

## 2025-04-15 ENCOUNTER — APPOINTMENT (OUTPATIENT)
Dept: ENDOCRINOLOGY | Facility: CLINIC | Age: 75
End: 2025-04-15
Payer: MEDICARE

## 2025-04-15 VITALS
HEART RATE: 84 BPM | SYSTOLIC BLOOD PRESSURE: 148 MMHG | BODY MASS INDEX: 44.39 KG/M2 | WEIGHT: 260 LBS | HEIGHT: 64 IN | DIASTOLIC BLOOD PRESSURE: 94 MMHG

## 2025-04-15 DIAGNOSIS — R80.9 MICROALBUMINURIA: Primary | ICD-10-CM

## 2025-04-15 DIAGNOSIS — E11.9 TYPE 2 DIABETES MELLITUS WITHOUT COMPLICATION, UNSPECIFIED WHETHER LONG TERM INSULIN USE: ICD-10-CM

## 2025-04-15 DIAGNOSIS — E11.3593 TYPE 2 DIABETES MELLITUS WITH PROLIFERATIVE RETINOPATHY OF BOTH EYES, WITHOUT LONG-TERM CURRENT USE OF INSULIN, MACULAR EDEMA PRESENCE UNSPECIFIED, UNSPECIFIED PROLIFERATIVE RETINOPATHY TYPE: ICD-10-CM

## 2025-04-15 PROCEDURE — 1160F RVW MEDS BY RX/DR IN RCRD: CPT | Performed by: INTERNAL MEDICINE

## 2025-04-15 PROCEDURE — 3080F DIAST BP >= 90 MM HG: CPT | Performed by: INTERNAL MEDICINE

## 2025-04-15 PROCEDURE — 3008F BODY MASS INDEX DOCD: CPT | Performed by: INTERNAL MEDICINE

## 2025-04-15 PROCEDURE — 4010F ACE/ARB THERAPY RXD/TAKEN: CPT | Performed by: INTERNAL MEDICINE

## 2025-04-15 PROCEDURE — 99214 OFFICE O/P EST MOD 30 MIN: CPT | Performed by: INTERNAL MEDICINE

## 2025-04-15 PROCEDURE — 1159F MED LIST DOCD IN RCRD: CPT | Performed by: INTERNAL MEDICINE

## 2025-04-15 PROCEDURE — G2211 COMPLEX E/M VISIT ADD ON: HCPCS | Performed by: INTERNAL MEDICINE

## 2025-04-15 PROCEDURE — 3077F SYST BP >= 140 MM HG: CPT | Performed by: INTERNAL MEDICINE

## 2025-04-15 RX ORDER — INSULIN PUMP SYRINGE, 3 ML
1 EACH MISCELLANEOUS AS NEEDED
COMMUNITY

## 2025-04-15 RX ORDER — GLIMEPIRIDE 4 MG/1
4 TABLET ORAL
Qty: 180 TABLET | Refills: 1 | Status: SHIPPED | OUTPATIENT
Start: 2025-04-15 | End: 2025-10-12

## 2025-04-15 RX ORDER — LANCETS 28 GAUGE
1 EACH MISCELLANEOUS DAILY
COMMUNITY

## 2025-04-15 ASSESSMENT — ENCOUNTER SYMPTOMS: NECK PAIN: 1

## 2025-04-15 NOTE — PROGRESS NOTES
"Subjective   Chen Vaca is a 74 y.o. female who presents for follow up for  Type 2 diabetes mellitus. The initial diagnosis of diabetes was made in 2022 .  At that time she was losing weight and had rapid developing cataracts    PCP recommended Januvia, Farxiga and Ozempic but these were all costly.      She has retinopathy.  She receives injections bilaterally every 2 months    She had right neck pain in January for four days.  Xray revealed arthritic changes and spondylosis.  She states that her eating habits and mediations got messed up as a result.    Her stress level was elevated as credit card got hacked and then got hacked again one month later     Known complications due to diabetes included retinopathy and obesity    Cardiovascular risk factors include advanced age (older than 55 for men, 65 for women), diabetes mellitus, hypertension, microalbuminuria, obesity (BMI >= 30 kg/m2), and sedentary lifestyle. The patient is on an ACE inhibitor or angiotensin II receptor blocker.  The patient has not been previously hospitalized due to diabetic ketoacidosis.     Current symptoms/problems include none. Her clinical course has been stable.     Current diabetes regimen is as follows:   Metformin 1000mg twice daily   Glimepiride 4mg once daily     The patient is currently checking the blood glucose.      Patient is using: glucometer  Today 171mg/dL     Hypoglycemia frequency: Symptoms at 100mg/dL   Hypoglycemia awareness: Yes     Exercise: never       Review of Systems   Eyes:  Positive for visual disturbance.   Cardiovascular:  Positive for leg swelling.   Genitourinary:         Nocturia x 2-3   Musculoskeletal:  Positive for neck pain.   Neurological:         Tingling to feet    All other systems reviewed and are negative.      Objective   BP (!) 148/94   Pulse 84   Ht 1.626 m (5' 4\")   Wt 118 kg (260 lb)   BMI 44.63 kg/m²   Physical Exam  Vitals and nursing note reviewed.   Constitutional:       General: " She is not in acute distress.     Appearance: Normal appearance. She is obese.   HENT:      Head: Normocephalic and atraumatic.      Nose: Nose normal.      Mouth/Throat:      Mouth: Mucous membranes are moist.   Eyes:      Extraocular Movements: Extraocular movements intact.   Pulmonary:      Effort: Pulmonary effort is normal.   Musculoskeletal:         General: Normal range of motion.      Right foot: Normal range of motion. No deformity, bunion or Charcot foot.      Left foot: Normal range of motion. No deformity, bunion or Charcot foot.   Feet:      Right foot:      Skin integrity: Skin integrity normal. No ulcer, blister, skin breakdown or erythema.      Toenail Condition: Right toenails are abnormally thick. Fungal disease present.     Left foot:      Skin integrity: Skin integrity normal. No ulcer, blister, skin breakdown or erythema.      Toenail Condition: Left toenails are abnormally thick. Fungal disease present.  Neurological:      Mental Status: She is alert and oriented to person, place, and time.   Psychiatric:         Mood and Affect: Mood normal.         Lab Review                    Health Maintenance:   Foot Exam:   Eye Exam:  scheduled for May 15, 2025; shots every 8 weeks   Lipid Panel:  April 10, 2025  Urine Albumin:   April 10, 2025    Assessment/Plan   74 year old female presents for follow up for type II  diabetes mellitus.  Her blood pressure is elevated above goal today.      Type 2 diabetes mellitus with retinopathy, without long-term current use of insulin  To increase Glimepiride 4mg to twice daily before breakfast and dinner   To continue Metformin 1000mg twice daily   To obtain fasting blood and urine tests before the next appointment  Please check blood sugars 1-2 times daily and keep a detailed log  Please keep feet moisturized and inspect daily  Counseled that the ideal BP is less than 130/80 or lower for diabetics   Counseled that the goal A1C should be 7% or less  Counseled  glycemic control is warranted to prevent microvascular complications      Microalbuminuria  To continue Losartan for kidney protection     For follow up in 6 months

## 2025-04-15 NOTE — PATIENT INSTRUCTIONS
Thank you for choosing [facility]  for your health care needs.  If you have any questions, concerns or medical needs, please feel free to contact our office at [phone].    Please ensure you complete your blood work one week before the next scheduled appointment.    To increase Glimepiride 4mg to twice daily before breakfast and dinner   To continue Metformin 1000mg twice daily   To obtain fasting blood and urine tests before the next appointment  To continue Losartan for kidney protection   Please check blood sugars 1-2 times daily and keep a detailed log  Please keep feet moisturized and inspect daily  Counseled that the ideal BP is less than 130/80 or lower for diabetics   Counseled that the goal A1C should be 7% or less  Counseled glycemic control is warranted to prevent microvascular complications  To follow up in 6 months

## 2025-04-16 PROBLEM — R80.9 MICROALBUMINURIA: Status: ACTIVE | Noted: 2025-04-16

## 2025-04-16 PROBLEM — E11.319 TYPE 2 DIABETES MELLITUS WITH RETINOPATHY, WITHOUT LONG-TERM CURRENT USE OF INSULIN: Status: ACTIVE | Noted: 2024-10-17

## 2025-04-16 RX ORDER — METFORMIN HYDROCHLORIDE 1000 MG/1
1000 TABLET ORAL
Qty: 180 TABLET | Refills: 1 | Status: SHIPPED | OUTPATIENT
Start: 2025-04-16 | End: 2025-10-13

## 2025-04-16 NOTE — ASSESSMENT & PLAN NOTE
To increase Glimepiride 4mg to twice daily before breakfast and dinner   To continue Metformin 1000mg twice daily   To obtain fasting blood and urine tests before the next appointment  Please check blood sugars 1-2 times daily and keep a detailed log  Please keep feet moisturized and inspect daily  Counseled that the ideal BP is less than 130/80 or lower for diabetics   Counseled that the goal A1C should be 7% or less  Counseled glycemic control is warranted to prevent microvascular complications

## 2025-04-22 ENCOUNTER — TELEPHONE (OUTPATIENT)
Dept: PRIMARY CARE | Facility: CLINIC | Age: 75
End: 2025-04-22
Payer: MEDICARE

## 2025-04-22 NOTE — TELEPHONE ENCOUNTER
Faxed paperwork over to [facility] today and it was successful and to the attn: Kraig    Fax:[phone]  Phone: [phone]

## 2025-04-30 ENCOUNTER — PATIENT OUTREACH (OUTPATIENT)
Dept: PRIMARY CARE | Facility: CLINIC | Age: 75
End: 2025-04-30
Payer: MEDICARE

## 2025-05-01 ENCOUNTER — APPOINTMENT (OUTPATIENT)
Dept: PRIMARY CARE | Facility: CLINIC | Age: 75
End: 2025-05-01
Payer: MEDICARE

## 2025-05-29 ENCOUNTER — PATIENT OUTREACH (OUTPATIENT)
Dept: PRIMARY CARE | Facility: CLINIC | Age: 75
End: 2025-05-29
Payer: MEDICARE

## 2025-06-09 ENCOUNTER — APPOINTMENT (OUTPATIENT)
Dept: PRIMARY CARE | Facility: CLINIC | Age: 75
End: 2025-06-09
Payer: MEDICARE

## 2025-06-09 DIAGNOSIS — Z91.199 NO-SHOW FOR APPOINTMENT: Primary | ICD-10-CM

## 2025-07-09 ENCOUNTER — TELEPHONE (OUTPATIENT)
Dept: ENDOCRINOLOGY | Facility: CLINIC | Age: 75
End: 2025-07-09

## 2025-07-09 NOTE — TELEPHONE ENCOUNTER
Next appt 10/15/2025    Pt stated that her spine specialist wants to put her on a 15mg Mobic tablet. Stated that they advised her to contact her endo to make sure that it is okay for her to take this medication.

## 2025-07-23 ENCOUNTER — APPOINTMENT (OUTPATIENT)
Dept: BEHAVIORAL HEALTH | Facility: CLINIC | Age: 75
End: 2025-07-23
Payer: MEDICARE

## 2025-10-15 ENCOUNTER — APPOINTMENT (OUTPATIENT)
Dept: ENDOCRINOLOGY | Facility: CLINIC | Age: 75
End: 2025-10-15